# Patient Record
Sex: FEMALE | Race: WHITE | NOT HISPANIC OR LATINO | Employment: OTHER | URBAN - METROPOLITAN AREA
[De-identification: names, ages, dates, MRNs, and addresses within clinical notes are randomized per-mention and may not be internally consistent; named-entity substitution may affect disease eponyms.]

---

## 2024-01-02 ENCOUNTER — TRANSFERRED RECORDS (OUTPATIENT)
Dept: HEALTH INFORMATION MANAGEMENT | Facility: CLINIC | Age: 74
End: 2024-01-02

## 2024-01-09 ENCOUNTER — TRANSFERRED RECORDS (OUTPATIENT)
Dept: HEALTH INFORMATION MANAGEMENT | Facility: CLINIC | Age: 74
End: 2024-01-09

## 2024-01-09 ENCOUNTER — MEDICAL CORRESPONDENCE (OUTPATIENT)
Dept: HEALTH INFORMATION MANAGEMENT | Facility: CLINIC | Age: 74
End: 2024-01-09

## 2024-03-05 ENCOUNTER — TRANSCRIBE ORDERS (OUTPATIENT)
Dept: OTHER | Age: 74
End: 2024-03-05

## 2024-03-05 DIAGNOSIS — R41.3 MEMORY CHANGES: Primary | ICD-10-CM

## 2024-03-10 ENCOUNTER — HEALTH MAINTENANCE LETTER (OUTPATIENT)
Age: 74
End: 2024-03-10

## 2024-05-10 ENCOUNTER — LAB (OUTPATIENT)
Dept: LAB | Facility: HOSPITAL | Age: 74
End: 2024-05-10
Payer: MEDICARE

## 2024-05-10 ENCOUNTER — OFFICE VISIT (OUTPATIENT)
Dept: NEUROLOGY | Facility: CLINIC | Age: 74
End: 2024-05-10
Payer: MEDICARE

## 2024-05-10 VITALS
RESPIRATION RATE: 18 BRPM | WEIGHT: 130 LBS | SYSTOLIC BLOOD PRESSURE: 130 MMHG | DIASTOLIC BLOOD PRESSURE: 80 MMHG | HEART RATE: 82 BPM

## 2024-05-10 DIAGNOSIS — Z87.820 HX OF TRAUMATIC BRAIN INJURY: ICD-10-CM

## 2024-05-10 DIAGNOSIS — R41.9 COGNITIVE COMPLAINTS: ICD-10-CM

## 2024-05-10 DIAGNOSIS — G20.C PRIMARY PARKINSONISM (H): ICD-10-CM

## 2024-05-10 DIAGNOSIS — G20.C PRIMARY PARKINSONISM (H): Primary | ICD-10-CM

## 2024-05-10 DIAGNOSIS — R41.89 SUBJECTIVE MEMORY COMPLAINTS: ICD-10-CM

## 2024-05-10 LAB — TSH SERPL DL<=0.005 MIU/L-ACNC: 1.45 UIU/ML (ref 0.3–4.2)

## 2024-05-10 PROCEDURE — 99205 OFFICE O/P NEW HI 60 MIN: CPT | Performed by: PSYCHIATRY & NEUROLOGY

## 2024-05-10 PROCEDURE — 36415 COLL VENOUS BLD VENIPUNCTURE: CPT

## 2024-05-10 PROCEDURE — G2211 COMPLEX E/M VISIT ADD ON: HCPCS | Performed by: PSYCHIATRY & NEUROLOGY

## 2024-05-10 PROCEDURE — 84443 ASSAY THYROID STIM HORMONE: CPT

## 2024-05-10 PROCEDURE — 82607 VITAMIN B-12: CPT

## 2024-05-10 RX ORDER — ATORVASTATIN CALCIUM 10 MG/1
10 TABLET, FILM COATED ORAL DAILY
COMMUNITY
Start: 2021-02-08 | End: 2024-07-01

## 2024-05-10 RX ORDER — MULTIVIT WITH MINERALS/LUTEIN
1 TABLET ORAL DAILY
COMMUNITY

## 2024-05-10 RX ORDER — LOSARTAN POTASSIUM 25 MG/1
25 TABLET ORAL DAILY
COMMUNITY
End: 2024-07-01

## 2024-05-10 ASSESSMENT — MONTREAL COGNITIVE ASSESSMENT (MOCA)
WHAT LEVEL OF EDUCATION WAS ATTAINED: 0
10. [FLUENCY] NAME WORDS STARTING WITH DESIGNATED LETTER: 0
13. ORIENTATION SUBSCORE: 6
8. SERIAL SUBTRACTION OF 7S: 0
11. FOR EACH PAIR OF WORDS, WHAT CATEGORY DO THEY BELONG TO (OUT OF 2): 2
9. REPEAT EACH SENTENCE: 2
6. READ LIST OF DIGITS [FORWARD/BACKWARD]: 2
12. MEMORY INDEX SCORE: 3
VISUOSPATIAL/EXECUTIVE SUBSCORE: 4
7. [VIGILENCE] TAP WHEN HEARING DESIGNATED LETTER: 1
WHAT IS THE TOTAL SCORE (OUT OF 30): 22
4. NAME EACH OF THE THREE ANIMALS SHOWN: 2

## 2024-05-10 NOTE — NURSING NOTE
Chief Complaint   Patient presents with    Consult     Balance concerns,rule out parkinsons ( handwriting is very difficult), Left sided weakness     Neeta Hanson MA,CMA,7:37 AM
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
## 2024-05-11 LAB — VIT B12 SERPL-MCNC: 498 PG/ML (ref 232–1245)

## 2024-05-22 ENCOUNTER — TELEPHONE (OUTPATIENT)
Dept: NEUROLOGY | Facility: CLINIC | Age: 74
End: 2024-05-22
Payer: MEDICARE

## 2024-05-22 ENCOUNTER — DOCUMENTATION ONLY (OUTPATIENT)
Dept: CARDIOLOGY | Facility: CLINIC | Age: 74
End: 2024-05-22
Payer: MEDICARE

## 2024-05-22 NOTE — TELEPHONE ENCOUNTER
Please abstract the following data from this visit with this patient into the appropriate field in Epic:    Tests that can be patient reported without a hard copy:    Mammogram done on this date: 02/06/2024 (approximately), by this group: [facility] in [place].    Colonoscopy done on this date: 2016 (approximately), by this group: [place], [place].    DEXA scan done on 09/12 at [facility].    Other Tests found in the patient's chart through Chart Review/Care Everywhere:    Note to Abstraction: If this section is blank, no results were found via Chart Review/Care Everywhere.  Hiwot Hoffmann, CMA

## 2024-05-23 ENCOUNTER — DOCUMENTATION ONLY (OUTPATIENT)
Dept: OTHER | Facility: CLINIC | Age: 74
End: 2024-05-23
Payer: MEDICARE

## 2024-06-13 ENCOUNTER — ANCILLARY PROCEDURE (OUTPATIENT)
Dept: NUCLEAR MEDICINE | Facility: CLINIC | Age: 74
End: 2024-06-13
Attending: PSYCHIATRY & NEUROLOGY
Payer: MEDICARE

## 2024-06-13 DIAGNOSIS — G20.C PARKINSONISM (H): Primary | ICD-10-CM

## 2024-06-13 PROCEDURE — 78803 RP LOCLZJ TUM SPECT 1 AREA: CPT | Mod: MG | Performed by: RADIOLOGY

## 2024-06-13 PROCEDURE — A9584 IODINE I-123 IOFLUPANE: HCPCS | Mod: JZ | Performed by: RADIOLOGY

## 2024-06-13 PROCEDURE — G1010 CDSM STANSON: HCPCS | Performed by: RADIOLOGY

## 2024-06-13 RX ORDER — IOFLUPANE I-123 2 MCI/ML
4-6 INJECTION, SOLUTION INTRAVENOUS ONCE
Status: COMPLETED | OUTPATIENT
Start: 2024-06-13 | End: 2024-06-13

## 2024-06-13 RX ADMIN — Medication 130 MG: at 11:04

## 2024-06-13 RX ADMIN — IOFLUPANE I-123 4.6 MILLICURIE: 2 INJECTION, SOLUTION INTRAVENOUS at 11:52

## 2024-07-01 ENCOUNTER — OFFICE VISIT (OUTPATIENT)
Dept: FAMILY MEDICINE | Facility: CLINIC | Age: 74
End: 2024-07-01
Payer: MEDICARE

## 2024-07-01 VITALS
BODY MASS INDEX: 22.14 KG/M2 | HEART RATE: 82 BPM | TEMPERATURE: 97.7 F | DIASTOLIC BLOOD PRESSURE: 78 MMHG | RESPIRATION RATE: 16 BRPM | HEIGHT: 64 IN | SYSTOLIC BLOOD PRESSURE: 122 MMHG | WEIGHT: 129.7 LBS | OXYGEN SATURATION: 98 %

## 2024-07-01 DIAGNOSIS — R26.89 BALANCE PROBLEMS: ICD-10-CM

## 2024-07-01 DIAGNOSIS — Z00.00 ENCOUNTER FOR MEDICARE ANNUAL WELLNESS EXAM: Primary | ICD-10-CM

## 2024-07-01 DIAGNOSIS — I10 BENIGN ESSENTIAL HYPERTENSION: ICD-10-CM

## 2024-07-01 DIAGNOSIS — E78.5 HYPERLIPIDEMIA LDL GOAL <100: ICD-10-CM

## 2024-07-01 DIAGNOSIS — Z78.0 ASYMPTOMATIC MENOPAUSE: ICD-10-CM

## 2024-07-01 DIAGNOSIS — R22.41 MASS OF RIGHT LOWER EXTREMITY: ICD-10-CM

## 2024-07-01 DIAGNOSIS — G20.C PRIMARY PARKINSONISM (H): ICD-10-CM

## 2024-07-01 LAB
ALBUMIN SERPL BCG-MCNC: 4.5 G/DL (ref 3.5–5.2)
ALP SERPL-CCNC: 92 U/L (ref 40–150)
ALT SERPL W P-5'-P-CCNC: 33 U/L (ref 0–50)
ANION GAP SERPL CALCULATED.3IONS-SCNC: 8 MMOL/L (ref 7–15)
AST SERPL W P-5'-P-CCNC: 27 U/L (ref 0–45)
BILIRUB SERPL-MCNC: 0.7 MG/DL
BUN SERPL-MCNC: 21.3 MG/DL (ref 8–23)
CALCIUM SERPL-MCNC: 9.7 MG/DL (ref 8.8–10.2)
CHLORIDE SERPL-SCNC: 102 MMOL/L (ref 98–107)
CHOLEST SERPL-MCNC: 167 MG/DL
CREAT SERPL-MCNC: 0.81 MG/DL (ref 0.51–0.95)
DEPRECATED HCO3 PLAS-SCNC: 29 MMOL/L (ref 22–29)
EGFRCR SERPLBLD CKD-EPI 2021: 76 ML/MIN/1.73M2
FASTING STATUS PATIENT QL REPORTED: ABNORMAL
FASTING STATUS PATIENT QL REPORTED: NORMAL
GLUCOSE SERPL-MCNC: 110 MG/DL (ref 70–99)
HDLC SERPL-MCNC: 74 MG/DL
LDLC SERPL CALC-MCNC: 73 MG/DL
NONHDLC SERPL-MCNC: 93 MG/DL
POTASSIUM SERPL-SCNC: 4.5 MMOL/L (ref 3.4–5.3)
PROT SERPL-MCNC: 7.1 G/DL (ref 6.4–8.3)
SODIUM SERPL-SCNC: 139 MMOL/L (ref 135–145)
TRIGL SERPL-MCNC: 102 MG/DL

## 2024-07-01 PROCEDURE — 80053 COMPREHEN METABOLIC PANEL: CPT

## 2024-07-01 PROCEDURE — 36415 COLL VENOUS BLD VENIPUNCTURE: CPT

## 2024-07-01 PROCEDURE — 80061 LIPID PANEL: CPT

## 2024-07-01 PROCEDURE — 99204 OFFICE O/P NEW MOD 45 MIN: CPT | Mod: 25

## 2024-07-01 PROCEDURE — G0438 PPPS, INITIAL VISIT: HCPCS

## 2024-07-01 RX ORDER — LOSARTAN POTASSIUM 25 MG/1
25 TABLET ORAL DAILY
Qty: 90 TABLET | Refills: 3 | Status: SHIPPED | OUTPATIENT
Start: 2024-07-01

## 2024-07-01 RX ORDER — ATORVASTATIN CALCIUM 10 MG/1
10 TABLET, FILM COATED ORAL DAILY
Qty: 90 TABLET | Refills: 3 | Status: SHIPPED | OUTPATIENT
Start: 2024-07-01

## 2024-07-01 ASSESSMENT — ENCOUNTER SYMPTOMS
PALPITATIONS: 0
WEAKNESS: 0
DYSPHORIC MOOD: 0
SHORTNESS OF BREATH: 0
DIARRHEA: 0
SLEEP DISTURBANCE: 0
COUGH: 0
TREMORS: 0
BLOOD IN STOOL: 0
CONSTIPATION: 0
DIFFICULTY URINATING: 0
ARTHRALGIAS: 0

## 2024-07-01 NOTE — PATIENT INSTRUCTIONS
"Patient Education   Preventive Care Advice   This is general advice we often give to help people stay healthy. Your care team may have specific advice just for you. Please talk to your care team about your own preventive care needs.  Lifestyle  Exercise at least 150 minutes each week (30 minutes a day, 5 days a week).  Do muscle strengthening activities 2 days a week. These help control your weight and prevent disease.  No smoking.  Wear sunscreen to prevent skin cancer.  Have your home tested for radon every 2 to 5 years. Radon is a colorless, odorless gas that can harm your lungs. To learn more, go to www.health.[facility].[place] and search for \"Radon in Homes.\"  Keep guns unloaded and locked up in a safe place like a safe or gun vault, or, use a gun lock and hide the keys. Always lock away bullets separately. To learn more, visit [employer].[place].gov and search for \"safe gun storage.\"  Nutrition  Eat 5 or more servings of fruits and vegetables each day.  Try wheat bread, brown rice and whole grain pasta (instead of white bread, rice, and pasta).  Get enough calcium and vitamin D. Check the label on foods and aim for 100% of the RDA (recommended daily allowance).  Regular exams  Have a dental exam and cleaning every 6 months.  See your health care team every year to talk about:  Any changes in your health.  Any medicines your care team has prescribed.  Preventive care, family planning, and ways to prevent chronic diseases.  Shots (vaccines)   HPV shots (up to age 26), if you've never had them before.  Hepatitis B shots (up to age 59), if you've never had them before.  COVID-19 shot: Get this shot when it's due.  Flu shot: Get a flu shot every year.  Tetanus shot: Get a tetanus shot every 10 years.  Pneumococcal, hepatitis A, and RSV shots: Ask your care team if you need these based on your risk.  Shingles shot (for age 50 and up).  General health tests  Diabetes screening:  Starting at age 35, Get screened for diabetes at least " every 3 years.  If you are younger than age 35, ask your care team if you should be screened for diabetes.  Cholesterol test: At age 39, start having a cholesterol test every 5 years, or more often if advised.  Bone density scan (DEXA): At age 50, ask your care team if you should have this scan for osteoporosis (brittle bones).  Hepatitis C: Get tested at least once in your life.  Abdominal aortic aneurysm screening: Talk to your doctor about having this screening if you:  Have ever smoked; and  Are biologically male; and  Are between the ages of 65 and 75.  STIs (sexually transmitted infections)  Before age 24: Ask your care team if you should be screened for STIs.  After age 24: Get screened for STIs if you're at risk. You are at risk for STIs (including HIV) if:  You are sexually active with more than one person.  You don't use condoms every time.  You or a partner was diagnosed with a sexually transmitted infection.  If you are at risk for HIV, ask about PrEP medicine to prevent HIV.  Get tested for HIV at least once in your life, whether you are at risk for HIV or not.  Cancer screening tests  Cervical cancer screening: If you have a cervix, begin getting regular cervical cancer screening tests at age 21. Most people who have regular screenings with normal results can stop after age 65. Talk about this with your provider.  Breast cancer scan (mammogram): If you've ever had breasts, begin having regular mammograms starting at age 40. This is a scan to check for breast cancer.  Colon cancer screening: It is important to start screening for colon cancer at age 45.  Have a colonoscopy test every 10 years (or more often if you're at risk) Or, ask your provider about stool tests like a FIT test every year or Cologuard test every 3 years.  To learn more about your testing options, visit: www.[employer]/187352.pdf.  For help making a decision, visit: joe/[email or web address].  Prostate cancer screening test: If you have a  prostate and are age 55 to 69, ask your provider if you would benefit from a yearly prostate cancer screening test.  Lung cancer screening: If you are a current or former smoker age 50 to 80, ask your care team if ongoing lung cancer screenings are right for you.  For informational purposes only. Not to replace the advice of your health care provider. Copyright   2023 [employer]. All rights reserved. Clinically reviewed by the [facility]. [employer] [id number] - REV 04/24.  Learning About Activities of Daily Living  What are activities of daily living?     Activities of daily living (ADLs) are the basic self-care tasks you do every day. These include eating, bathing, dressing, and moving around.  As you age, and if you have health problems, you may find that it's harder to do some of these tasks. If so, your doctor can suggest ideas that may help.  To measure what kind of help you may need, your doctor will ask how well you are able to do ADLs. Let your doctor know if there are any tasks that you are having trouble doing. This is an important first step to getting help. And when you have the help you need, you can stay as independent as possible.  How will a doctor assess your ADLs?  Asking about ADLs is part of a routine health checkup your doctor will likely do as you age. Your health check might be done in a doctor's office, in your home, or at a hospital. The goal is to find out if you are having any problems that could make it hard to care for yourself or that make it unsafe for you to be on your own.  To measure your ADLs, your doctor will ask how hard it is for you to do routine tasks. Your doctor may also want to know if you have changed the way you do a task because of a health problem. Your doctor may watch how you:  Walk back and forth.  Keep your balance while you stand or walk.  Move from sitting to standing or from a bed to a chair.  Button or unbutton a shirt or  sweater.  Remove and put on your shoes.  It's common to feel a little worried or anxious if you find you can't do all the things you used to be able to do. Talking with your doctor about ADLs is a way to make sure you're as safe as possible and able to care for yourself as well as you can. You may want to bring a caregiver, friend, or family member to your checkup. They can help you talk to your doctor.  Follow-up care is a key part of your treatment and safety. Be sure to make and go to all appointments, and call your doctor if you are having problems. It's also a good idea to know your test results and keep a list of the medicines you take.  Current as of: October 24, 2023               Content Version: 14.0    1397-3968 "[employer].   Care instructions adapted under license by your healthcare professional. If you have questions about a medical condition or this instruction, always ask your healthcare professional. "[employer] disclaims any warranty or liability for your use of this information.      Preventing Falls: Care Instructions  Injuries and health problems such as trouble walking or poor eyesight can increase your risk of falling. So can some medicines. But there are things you can do to help prevent falls. You can exercise to get stronger. You can also arrange your home to make it safer.    Talk to your doctor about the medicines you take. Ask if any of them increase the risk of falls and whether they can be changed or stopped.   Try to exercise regularly. It can help improve your strength and balance. This can help lower your risk of falling.     Practice fall safety and prevention.    Wear low-heeled shoes that fit well and give your feet good support. Talk to your doctor if you have foot problems that make this hard.  Carry a cellphone or wear a medical alert device that you can use to call for help.  Use stepladders instead of chairs to reach high objects. Don't climb if  "you're at risk for falls. Ask for help, if needed.  Wear the correct eyeglasses, if you need them.    Make your home safer.    Remove rugs, cords, clutter, and furniture from walkways.  Keep your house well lit. Use night-lights in hallways and bathrooms.  Install and use sturdy handrails on stairways.  Wear nonskid footwear, even inside. Don't walk barefoot or in socks without shoes.    Be safe outside.    Use handrails, curb cuts, and ramps whenever possible.  Keep your hands free by using a shoulder bag or backpack.  Try to walk in well-lit areas. Watch out for uneven ground, changes in pavement, and debris.  Be careful in the winter. Walk on the grass or gravel when sidewalks are slippery. Use de-icer on steps and walkways. Add non-slip devices to shoes.    Put grab bars and nonskid mats in your shower or tub and near the toilet. Try to use a shower chair or bath bench when bathing.   Get into a tub or shower by putting in your weaker leg first. Get out with your strong side first. Have a phone or medical alert device in the bathroom with you.   Where can you learn more?  Go to https://www.[employer].net/patiented  Enter G117 in the search box to learn more about \"Preventing Falls: Care Instructions.\"  Current as of: July 17, 2023               Content Version: 14.0    8448-2985 [employer].   Care instructions adapted under license by your healthcare professional. If you have questions about a medical condition or this instruction, always ask your healthcare professional. [employer] disclaims any warranty or liability for your use of this information.      Learning About Sleeping Well  What does sleeping well mean?     Sleeping well means getting enough sleep to feel good and stay healthy. How much sleep is enough varies among people.  The number of hours you sleep and how you feel when you wake up are both important. If you do not feel refreshed, you probably need more sleep. Another " "sign of not getting enough sleep is feeling tired during the day.  Experts recommend that adults get at least 7 or more hours of sleep per day. Children and older adults need more sleep.  Why is getting enough sleep important?  Getting enough quality sleep is a basic part of good health. When your sleep suffers, your physical health, mood, and your thoughts can suffer too. You may find yourself feeling more grumpy or stressed. Not getting enough sleep also can lead to serious problems, including injury, accidents, anxiety, and depression.  What might cause poor sleeping?  Many things can cause sleep problems, including:  Changes to your sleep schedule.  Stress. Stress can be caused by fear about a single event, such as giving a speech. Or you may have ongoing stress, such as worry about work or school.  Depression, anxiety, and other mental or emotional conditions.  Changes in your sleep habits or surroundings. This includes changes that happen where you sleep, such as noise, light, or sleeping in a different bed. It also includes changes in your sleep pattern, such as having jet lag or working a late shift.  Health problems, such as pain, breathing problems, and restless legs syndrome.  Lack of regular exercise.  Using alcohol, nicotine, or caffeine before bed.  How can you help yourself?  Here are some tips that may help you sleep more soundly and wake up feeling more refreshed.  Your sleeping area   Use your bedroom only for sleeping and sex. A bit of light reading may help you fall asleep. But if it doesn't, do your reading elsewhere in the house. Try not to use your TV, computer, smartphone, or tablet while you are in bed.  Be sure your bed is big enough to stretch out comfortably, especially if you have a sleep partner.  Keep your bedroom quiet, dark, and cool. Use curtains, blinds, or a sleep mask to block out light. To block out noise, use earplugs, soothing music, or a \"white noise\" machine.  Your evening " "and bedtime routine   Create a relaxing bedtime routine. You might want to take a warm shower or bath, or listen to soothing music.  Go to bed at the same time every night. And get up at the same time every morning, even if you feel tired.  What to avoid   Limit caffeine (coffee, tea, caffeinated sodas) during the day, and don't have any for at least 6 hours before bedtime.  Avoid drinking alcohol before bedtime. Alcohol can cause you to wake up more often during the night.  Try not to smoke or use tobacco, especially in the evening. Nicotine can keep you awake.  Limit naps during the day, especially close to bedtime.  Avoid lying in bed awake for too long. If you can't fall asleep or if you wake up in the middle of the night and can't get back to sleep within about 20 minutes, get out of bed and go to another room until you feel sleepy.  Avoid taking medicine right before bed that may keep you awake or make you feel hyper or energized. Your doctor can tell you if your medicine may do this and if you can take it earlier in the day.  If you can't sleep   Imagine yourself in a peaceful, pleasant scene. Focus on the details and feelings of being in a place that is relaxing.  Get up and do a quiet or boring activity until you feel sleepy.  Avoid drinking any liquids before going to bed to help prevent waking up often to use the bathroom.  Where can you learn more?  Go to https://www.[employer].net/patiented  Enter J942 in the search box to learn more about \"Learning About Sleeping Well.\"  Current as of: July 10, 2023               Content Version: 14.0    7782-4387 [employer].   Care instructions adapted under license by your healthcare professional. If you have questions about a medical condition or this instruction, always ask your healthcare professional. [employer] disclaims any warranty or liability for your use of this information.         "

## 2024-07-01 NOTE — PROGRESS NOTES
Preventive Care Visit  [facility]  LILIANA Thompson CNP, Family Medicine  Jul 1, 2024      Assessment & Plan     Encounter for Medicare annual wellness exam  On exam, pleasant 74 year old patient. History of   Past Medical History:   Diagnosis Date    Hypertension 2018    Take meds for this    Parkinson disease (H)     No acute or concerning findings on today's physical exam. Vital signs at goal. Mini Cog is without concern. PHQ-2 is 0. No changes to therapies today.   - REVIEW OF HEALTH MAINTENANCE PROTOCOL ORDERS    PHQ-2 Score:         7/1/2024    10:43 AM 6/30/2024     3:42 PM   PHQ-2 ( 1999 Pfizer)   Q1: Little interest or pleasure in doing things 0 0   Q2: Feeling down, depressed or hopeless 0 0   PHQ-2 Score 0 0   Q1: Little interest or pleasure in doing things Not at all    Q2: Feeling down, depressed or hopeless Not at all    PHQ-2 Score 0       Hyperlipidemia LDL goal <100  Continue with current management without changes.  No muscle pains.   Discussed healthy diet and lifestyle.  No aches or pains.   CMP Ordered  Repeat lipid Ordered  - atorvastatin (LIPITOR) 10 MG tablet; Take 1 tablet (10 mg) by mouth daily  - Lipid panel reflex to direct LDL Non-fasting  - Comprehensive metabolic panel (BMP + Alb, Alk Phos, ALT, AST, Total. Bili, TP)    Balance problems  Primary parkinsonism (H)  Established with Dr. Serrano. Managing PD medications which includes Sinemet TID at this time. Patient repots good days and bad days. Balance problems. Declines step therapy/PT at this time, may consider in the future.Has fallen. DEXA ordered as below.     Asymptomatic menopause  Patient due for DEXA scan, doesn't know if she has every had one completed. Ordered today. No known fractures. Does have diagnosis of PF and balance concerns so reducing fracture risk would be beneficial.   - DEXA HIP/PELVIS/SPINE - Future; Future    Benign essential hypertension  Controlled. Continue current  medications. No change in management. No symptoms of chest pain, palpitations, dizziness, headaches, vision changes, SOB, orthopnea or peripheral edema.   - losartan (COZAAR) 25 MG tablet; Take 1 tablet (25 mg) by mouth daily    Leg mass, right  Soft, non-tender mass to the top of the right leg. Anteriorly located. Chronic for over 5 years. Thought to be lipoma from another provider, agree with this assessment. Has not increased in size. No other masses. Does not cause pain or fluctuate. Offered US today, patient declines.     Counseling  Appropriate preventive services were addressed with this patient via screening, questionnaire, or discussion as appropriate for fall prevention, nutrition, physical activity, Tobacco-use cessation, weight loss and cognition.  Checklist reviewing preventive services available has been given to the patient.      Romi Perez is a 74 year old, presenting for the following:  Wellness Visit and Establish Care        7/1/2024    10:41 AM   Additional Questions   Roomed by Nita LAMB MA   Accompanied by          Health Care Directive  Patient has a Health Care Directive on file  Advance care planning document is on file and is current.    History of Present Illness       Reason for visit:  Follow-up    She eats 0-1 servings of fruits and vegetables daily.She consumes 3 sweetened beverage(s) daily.She exercises with enough effort to increase her heart rate 20 to 29 minutes per day.  She exercises with enough effort to increase her heart rate 5 days per week.   She is taking medications regularly.    Saw neurology for Parkinson's concerns. Noticed symptoms with typing. Balanced concerns. No longer biking. Declines PT. Walking with spouse.     Taking Lipitor. Tolerating well. No muscle aches and pains.     Taking Losartan. No CP, SOB, vision changes not corrected by lenses, swelling in ankles. There is no HA.     Mammogram in Feb of 2024. No concerns.     Colonoscopy she thinks  was good for ten years. Does not need another one due to age she reports.     Lump in leg that she would like me to look at. Told Dr. Senior thought it was a lipoma.     DEXA in December in 2012.     COVID 4/1/24 at [facility].         7/1/2024   General Health   How would you rate your overall physical health? Good   Feel stress (tense, anxious, or unable to sleep) To some extent      (!) STRESS CONCERN      7/1/2024   Nutrition   Diet: Regular (no restrictions)          7/1/2024   Social Factors   Worry food won't last until get money to buy more No   Food not last or not have enough money for food? No   Do you have housing? (Housing is defined as stable permanent housing and does not include staying ouside in a car, in a tent, in an abandoned building, in an overnight shelter, or couch-surfing.) Yes   Are you worried about losing your housing? No   Lack of transportation? No   Unable to get utilities (heat,electricity)? No          7/1/2024   Fall Risk   Fallen 2 or more times in the past year? Yes   Trouble with walking or balance? Yes   Gait Speed Test (Document in seconds) 3.94   Gait Speed Test Interpretation Less than or equal to 5.00 seconds - PASS            7/1/2024   Activities of Daily Living- Home Safety   Needs help with the following daily activites Transportation   Safety concerns in the home Throw rugs in the hallway            7/1/2024   Dental   Dentist two times every year? Yes            7/1/2024   Hearing Screening   Hearing concerns? None of the above            7/1/2024   Driving Risk Screening   Patient/family members have concerns about driving (!) DECLINE            7/1/2024   General Alertness/Fatigue Screening   Have you been more tired than usual lately? (!) YES          7/1/2024   Urinary Incontinence Screening   Bothered by leaking urine in past 6 months No          7/1/2024   TB Screening   Were you born outside of the [place]? Decline     Today's PHQ-2 Score:       7/1/2024     10:43 AM   PHQ-2 ( 1999 Pfizer)   Q1: Little interest or pleasure in doing things 0   Q2: Feeling down, depressed or hopeless 0   PHQ-2 Score 0   Q1: Little interest or pleasure in doing things Not at all   Q2: Feeling down, depressed or hopeless Not at all   PHQ-2 Score 0         7/1/2024   Substance Use   Alcohol more than 3/day or more than 7/wk Not Applicable   Do you have a current opioid prescription? No   How severe/bad is pain from 1 to 10? 0/10 (No Pain)   Do you use any other substances recreationally? No      Social History     Tobacco Use    Smoking status: Never     Passive exposure: Never    Smokeless tobacco: Never   Vaping Use    Vaping status: Never Used   Substance Use Topics    Alcohol use: Yes     Comment: rare    Drug use: Never     Mammogram Screening - Mammogram every 1-2 years updated in Health Maintenance based on mutual decision making    ASCVD Risk   The 10-year ASCVD risk score (Catherine YATES, et al., 2019) is: 18.8%    Values used to calculate the score:      Age: 74 years      Sex: Female      Is Non- : No      Diabetic: No      Tobacco smoker: No      Systolic Blood Pressure: 129 mmHg      Is BP treated: Yes      HDL Cholesterol: 74 mg/dL      Total Cholesterol: 167 mg/dL    Reviewed and updated as needed this visit by Provider   Tobacco     Med Hx  Surg Hx   Soc Hx Sexual Activity          Current providers sharing in care for this patient include:  Patient Care Team:  Shanda Bell APRN CNP as PCP - General (Family Medicine)  Kervin Serrano MD as MD (Neurology)  Kervin Serrano MD as Assigned Neuroscience Provider  Vikki Gilliland Psy.D, LP as Psychologist (Neuropsychology)    The following health maintenance items are reviewed in Epic and correct as of today:  Health Maintenance   Topic Date Due    DEXA  Never done    LIPID  Never done    ANNUAL REVIEW OF HM ORDERS  Never done    GLUCOSE  Never done    HEPATITIS C SCREENING  Never done     "MEDICARE ANNUAL WELLNESS VISIT  Never done    COVID-19 Vaccine (2 - 2023-24 season) 02/09/2024    INFLUENZA VACCINE (1) 09/01/2024    FALL RISK ASSESSMENT  07/01/2025    COLORECTAL CANCER SCREENING  01/01/2026    MAMMO SCREENING  02/06/2026    ADVANCE CARE PLANNING  05/23/2029    DTAP/TDAP/TD IMMUNIZATION (2 - Td or Tdap) 08/15/2033    PHQ-2 (once per calendar year)  Completed    Pneumococcal Vaccine: 65+ Years  Completed    ZOSTER IMMUNIZATION  Completed    RSV VACCINE (Pregnancy & 60+)  Completed    IPV IMMUNIZATION  Aged Out    HPV IMMUNIZATION  Aged Out    MENINGITIS IMMUNIZATION  Aged Out    RSV MONOCLONAL ANTIBODY  Aged Out     Review of Systems   Respiratory:  Negative for cough and shortness of breath.    Cardiovascular:  Negative for chest pain, palpitations and leg swelling.   Gastrointestinal:  Negative for blood in stool, constipation and diarrhea.   Genitourinary:  Negative for difficulty urinating and vaginal bleeding.   Musculoskeletal:  Positive for gait problem. Negative for arthralgias.   Neurological:  Negative for tremors and weakness.        Balance concerns   Psychiatric/Behavioral:  Negative for dysphoric mood and sleep disturbance.          Objective    Exam  /78 (BP Location: Right arm, Patient Position: Sitting, Cuff Size: Adult Regular)   Pulse 82   Temp 97.7  F (36.5  C) (Oral)   Resp 16   Ht 5' 4.41\" (1.636 m)   Wt 129 lb 11.2 oz (58.8 kg)   LMP  (LMP Unknown)   SpO2 98%   BMI 21.98 kg/m     Estimated body mass index is 21.98 kg/m  as calculated from the following:    Height as of this encounter: 5' 4.41\" (1.636 m).    Weight as of this encounter: 129 lb 11.2 oz (58.8 kg).    Physical Exam  GENERAL: alert and no distress  EYES: Eyes grossly normal to inspection, PERRL and conjunctivae and sclerae normal  HENT: ear canals and TM's normal, nose and mouth without ulcers or lesions  NECK: no adenopathy, no asymmetry, masses, or scars  RESP: lungs clear to auscultation - no " rales, rhonchi or wheezes  CV: regular rate and rhythm, normal S1 S2, no S3 or S4, no murmur, click or rub, no peripheral edema  ABDOMEN: soft, nontender, no hepatosplenomegaly, no masses and bowel sounds normal  MS: no gross musculoskeletal defects noted, no edema. Soft, palpable mass to the top of the right leg, anteriorly located.   SKIN: no suspicious lesions or rashes  NEURO: Normal strength and tone, mentation intact and speech normal  PSYCH: mentation appears normal, affect normal/bright        7/1/2024   Mini Cog   Clock Draw Score 2 Normal   3 Item Recall 2 objects recalled   Mini Cog Total Score 4        At the end of the visit, I confirmed understanding of what was discussed. Ana and spouse, Víctor,  have no further questions or concerns that were brought up at this time.      Brandy Bell DNP, APRN, FNP-C

## 2024-07-10 ENCOUNTER — OFFICE VISIT (OUTPATIENT)
Dept: NEUROLOGY | Facility: CLINIC | Age: 74
End: 2024-07-10
Payer: MEDICARE

## 2024-07-10 VITALS
SYSTOLIC BLOOD PRESSURE: 129 MMHG | WEIGHT: 130.6 LBS | DIASTOLIC BLOOD PRESSURE: 79 MMHG | HEART RATE: 65 BPM | BODY MASS INDEX: 22.13 KG/M2

## 2024-07-10 DIAGNOSIS — R41.89 SUBJECTIVE MEMORY COMPLAINTS: ICD-10-CM

## 2024-07-10 DIAGNOSIS — R41.9 COGNITIVE COMPLAINTS: ICD-10-CM

## 2024-07-10 DIAGNOSIS — G20.C PRIMARY PARKINSONISM (H): Primary | ICD-10-CM

## 2024-07-10 DIAGNOSIS — Z87.820 HX OF TRAUMATIC BRAIN INJURY: ICD-10-CM

## 2024-07-10 PROCEDURE — G2211 COMPLEX E/M VISIT ADD ON: HCPCS | Performed by: PSYCHIATRY & NEUROLOGY

## 2024-07-10 PROCEDURE — 99215 OFFICE O/P EST HI 40 MIN: CPT | Performed by: PSYCHIATRY & NEUROLOGY

## 2024-07-10 RX ORDER — CARBIDOPA AND LEVODOPA 25; 100 MG/1; MG/1
1 TABLET ORAL 3 TIMES DAILY
Qty: 90 TABLET | Refills: 2 | Status: SHIPPED | OUTPATIENT
Start: 2024-07-10 | End: 2024-08-06
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
## 2024-07-10 NOTE — NURSING NOTE
"Juanita Robbins is a 74 year old female who presents for:  Chief Complaint   Patient presents with    Follow Up     More tiks on the left arm   Datscan review         Initial Vitals:  BP (!) 148/91   Pulse 75   Wt 59.2 kg (130 lb 9.6 oz)   LMP  (LMP Unknown)   BMI 22.13 kg/m   Estimated body mass index is 22.13 kg/m  as calculated from the following:    Height as of 7/1/24: 1.636 m (5' 4.41\").    Weight as of this encounter: 59.2 kg (130 lb 9.6 oz).. Body surface area is 1.64 meters squared. BP completed using cuff size: wrist cuff    Mars Cortez  "

## 2024-07-30 ENCOUNTER — HOSPITAL ENCOUNTER (OUTPATIENT)
Dept: BONE DENSITY | Facility: HOSPITAL | Age: 74
Discharge: HOME | End: 2024-07-30
Payer: MEDICARE

## 2024-07-30 DIAGNOSIS — Z78.0 ASYMPTOMATIC MENOPAUSE: ICD-10-CM

## 2024-07-30 PROCEDURE — 77089 TBS DXA CAL W/I&R FX RISK: CPT

## 2024-07-30 PROCEDURE — 77080 DXA BONE DENSITY AXIAL: CPT

## 2024-08-06 ENCOUNTER — MYC REFILL (OUTPATIENT)
Dept: NEUROLOGY | Facility: CLINIC | Age: 74
End: 2024-08-06
Payer: MEDICARE

## 2024-08-06 DIAGNOSIS — G20.C PRIMARY PARKINSONISM (H): ICD-10-CM

## 2024-08-06 NOTE — TELEPHONE ENCOUNTER
Refill request for: carbidopa levodopa 25-100mg   Directions: Take 1 tablet by mouth 3 times daily Take at least 30 min before meals or 2 hours after meals. Do not mix with food     LOV: 07/10/24  NOV: 01/13/25    Pt requesting 90 day supply instead of 30 days.    90 day supply with 1 refills Medication T'd for review and signature    Fouzia Jones LPN on 8/6/2024 at 3:30 PM

## 2024-08-07 ENCOUNTER — TELEPHONE (OUTPATIENT)
Dept: FAMILY MEDICINE | Facility: CLINIC | Age: 74
End: 2024-08-07
Payer: MEDICARE

## 2024-08-07 RX ORDER — CARBIDOPA AND LEVODOPA 25; 100 MG/1; MG/1
1 TABLET ORAL 3 TIMES DAILY
Qty: 270 TABLET | Refills: 1 | Status: SHIPPED | OUTPATIENT
Start: 2024-08-07

## 2024-08-07 NOTE — TELEPHONE ENCOUNTER
Called and spoke with pt regarding bone density results. Pt has a telephone visit with PCP 8/22 to discuss treatment options.    ALBERTO Mendez.

## 2024-08-08 ENCOUNTER — MYC MEDICAL ADVICE (OUTPATIENT)
Dept: FAMILY MEDICINE | Facility: CLINIC | Age: 74
End: 2024-08-08
Payer: MEDICARE

## 2024-08-09 ENCOUNTER — MYC MEDICAL ADVICE (OUTPATIENT)
Dept: FAMILY MEDICINE | Facility: CLINIC | Age: 74
End: 2024-08-09
Payer: MEDICARE

## 2024-08-22 ENCOUNTER — VIRTUAL VISIT (OUTPATIENT)
Dept: FAMILY MEDICINE | Facility: CLINIC | Age: 74
End: 2024-08-22
Payer: MEDICARE

## 2024-08-22 ENCOUNTER — MYC MEDICAL ADVICE (OUTPATIENT)
Dept: FAMILY MEDICINE | Facility: CLINIC | Age: 74
End: 2024-08-22

## 2024-08-22 DIAGNOSIS — M81.0 AGE-RELATED OSTEOPOROSIS WITHOUT CURRENT PATHOLOGICAL FRACTURE: Primary | ICD-10-CM

## 2024-08-22 PROCEDURE — 99442 PR PHYSICIAN TELEPHONE EVALUATION 11-20 MIN: CPT | Mod: 93

## 2024-08-22 NOTE — PROGRESS NOTES
Ana is a 74 year old who is being evaluated via a billable telephone visit.    What phone number would you like to be contacted at? [phone]  How would you like to obtain your AVS? Corey  Originating Location (pt. Location): Home    Distant Location (provider location):  On-site    Assessment & Plan     Age-related osteoporosis without current pathological fracture  DEXA scan shows osteoporosis in several locations.  Discussed with patient today.  Patient does have a history of Parkinson's disease with somewhat unsteady gait, but has been managing well.  He is changing lifestyle habits and exercise to make it safer to prevent falls.  I do think it be beneficial for patient to be on a biphosphonate, does not have any esophageal disorders or trouble swallowing.  We will check a vitamin D, calcium, phosphorus, and magnesium today.  If normal start on biphosphonate.  We will do Risedronate monthly.  Discussed appropriate use and how to take this.  Patient has no questions or concerns.  Will follow-up once lab results are in.  - Vitamin D Deficiency; Future  - Calcium; Future  - Phosphorus; Future  - Magnesium; Future    Subjective   Ana is a 74 year old, presenting for the following health issues:  Review Results  (Dexa scan )      8/22/2024    11:10 AM   Additional Questions   Roomed by JACE Aleman     History of Present Illness       Reason for visit:  Osteoporosis dixacan  Symptoms include:  None but Dexacan reported I was at risk  Had these symptoms before:  No    She eats 2-3 servings of fruits and vegetables daily.She consumes 0 sweetened beverage(s) daily.She exercises with enough effort to increase her heart rate 30 to 60 minutes per day.  She exercises with enough effort to increase her heart rate 5 days per week.   She is taking medications regularly.     Review DEXA results.  Patient is taking a multivitamin, but no specific vitamin D or calcium.  Has not had a broken bone.  Some difficulty  "with certain activities, but has not fallen.  Is no longer biking to decrease the risk of a fall.    ROS: Negative for acute concerns today.       Objective    Vitals - Patient Reported  Weight (Patient Reported): 59.1 kg (130 lb 6.4 oz)  Height (Patient Reported): 164.5 cm (5' 4.75\")  BMI (Based on Pt Reported Ht/Wt): 21.87    Physical Exam   General: Alert and no distress //Respiratory: No audible wheeze, cough, or shortness of breath // Psychiatric:  Appropriate affect, tone, and pace of words      Phone call duration: 13 minutes    At the end of the visit, I confirmed understanding of what was discussed. Juanita has no further questions or concerns that were brought up at this time.      Brandy Bell DNP, APRN, FNP-C    "

## 2024-08-24 ENCOUNTER — LAB (OUTPATIENT)
Dept: LAB | Facility: CLINIC | Age: 74
End: 2024-08-24
Payer: MEDICARE

## 2024-08-24 DIAGNOSIS — Z11.59 NEED FOR HEPATITIS C SCREENING TEST: Primary | ICD-10-CM

## 2024-08-24 DIAGNOSIS — M81.0 AGE-RELATED OSTEOPOROSIS WITHOUT CURRENT PATHOLOGICAL FRACTURE: ICD-10-CM

## 2024-08-24 LAB
CALCIUM SERPL-MCNC: 10.2 MG/DL (ref 8.8–10.4)
HCV AB SERPL QL IA: NONREACTIVE
MAGNESIUM SERPL-MCNC: 2.3 MG/DL (ref 1.7–2.3)
PHOSPHATE SERPL-MCNC: 3.4 MG/DL (ref 2.5–4.5)
VIT D+METAB SERPL-MCNC: 42 NG/ML (ref 20–50)

## 2024-08-24 PROCEDURE — 82306 VITAMIN D 25 HYDROXY: CPT

## 2024-08-24 PROCEDURE — 86803 HEPATITIS C AB TEST: CPT

## 2024-08-24 PROCEDURE — 36415 COLL VENOUS BLD VENIPUNCTURE: CPT

## 2024-08-24 PROCEDURE — 83735 ASSAY OF MAGNESIUM: CPT

## 2024-08-24 PROCEDURE — 82310 ASSAY OF CALCIUM: CPT

## 2024-08-24 PROCEDURE — 84100 ASSAY OF PHOSPHORUS: CPT

## 2024-08-26 RX ORDER — RISEDRONATE SODIUM 150 MG/1
150 TABLET, FILM COATED ORAL
Qty: 3 TABLET | Refills: 3 | Status: SHIPPED | OUTPATIENT
Start: 2024-08-26

## 2024-09-16 ENCOUNTER — MYC MEDICAL ADVICE (OUTPATIENT)
Dept: FAMILY MEDICINE | Facility: CLINIC | Age: 74
End: 2024-09-16
Payer: MEDICARE

## 2024-10-18 ENCOUNTER — ALLIED HEALTH/NURSE VISIT (OUTPATIENT)
Dept: FAMILY MEDICINE | Facility: CLINIC | Age: 74
End: 2024-10-18
Payer: MEDICARE

## 2024-10-18 DIAGNOSIS — Z23 ENCOUNTER FOR IMMUNIZATION: Primary | ICD-10-CM

## 2024-10-18 PROCEDURE — 90480 ADMN SARSCOV2 VAC 1/ONLY CMP: CPT

## 2024-10-18 PROCEDURE — 91320 SARSCV2 VAC 30MCG TRS-SUC IM: CPT

## 2024-10-28 ENCOUNTER — MYC REFILL (OUTPATIENT)
Dept: LAB | Facility: CLINIC | Age: 74
End: 2024-10-28

## 2024-10-28 DIAGNOSIS — M81.0 AGE-RELATED OSTEOPOROSIS WITHOUT CURRENT PATHOLOGICAL FRACTURE: ICD-10-CM

## 2024-11-15 ENCOUNTER — MYC MEDICAL ADVICE (OUTPATIENT)
Dept: NEUROLOGY | Facility: CLINIC | Age: 74
End: 2024-11-15
Payer: MEDICARE

## 2024-11-15 NOTE — TELEPHONE ENCOUNTER
Yes we would need to meet sooner.  Most likely it is not related to the Parkinson's.  There are other neurological conditions that cause this.

## 2024-11-20 ENCOUNTER — OFFICE VISIT (OUTPATIENT)
Dept: NEUROLOGY | Facility: CLINIC | Age: 74
End: 2024-11-20
Payer: MEDICARE

## 2024-11-20 ENCOUNTER — LAB (OUTPATIENT)
Dept: LAB | Facility: HOSPITAL | Age: 74
End: 2024-11-20
Payer: MEDICARE

## 2024-11-20 VITALS
HEART RATE: 82 BPM | WEIGHT: 132 LBS | DIASTOLIC BLOOD PRESSURE: 90 MMHG | SYSTOLIC BLOOD PRESSURE: 149 MMHG | BODY MASS INDEX: 22.53 KG/M2 | HEIGHT: 64 IN

## 2024-11-20 DIAGNOSIS — G20.C PRIMARY PARKINSONISM (H): Primary | ICD-10-CM

## 2024-11-20 DIAGNOSIS — Z87.820 HX OF TRAUMATIC BRAIN INJURY: ICD-10-CM

## 2024-11-20 DIAGNOSIS — R41.89 SUBJECTIVE MEMORY COMPLAINTS: ICD-10-CM

## 2024-11-20 DIAGNOSIS — R41.9 COGNITIVE COMPLAINTS: ICD-10-CM

## 2024-11-20 DIAGNOSIS — G70.00 MYASTHENIA GRAVIS WITHOUT EXACERBATION (H): ICD-10-CM

## 2024-11-20 PROCEDURE — 86041 ACETYLCHOLN RCPTR BNDNG ANTB: CPT

## 2024-11-20 PROCEDURE — 86043 ACETYLCHOLN RCPTR MODLG ANTB: CPT

## 2024-11-20 PROCEDURE — 86255 FLUORESCENT ANTIBODY SCREEN: CPT

## 2024-11-20 PROCEDURE — 36415 COLL VENOUS BLD VENIPUNCTURE: CPT

## 2024-11-20 PROCEDURE — 86042 ACETYLCHOLN RCPTR BLCKG ANTB: CPT

## 2024-11-20 RX ORDER — PYRIDOSTIGMINE BROMIDE 60 MG/1
60 TABLET ORAL 3 TIMES DAILY
Qty: 270 TABLET | Refills: 1 | Status: SHIPPED | OUTPATIENT
Start: 2024-11-20

## 2024-11-20 NOTE — NURSING NOTE
Chief Complaint   Patient presents with    Parkinson     Patient states she is getting double vision when fatigued. She had her eyes checked in January. Symptoms began within the last month. Her voice gets quieter when she is fatigued also.      Eileen Nunn MA

## 2024-11-20 NOTE — PATIENT INSTRUCTIONS
Instructions for Blood Draw    Hours:   [facility]: M-F 7am-5pm, Saturday- 9am-1pm No appointment is needed.  [facility]: M-F 7am-5pm, Saturday & - 9am-12pm No appointment is needed.  Schedule Lab Appointments through [employer] or by [phone] ([phone])    [facility] (2nd floor) or [facility] ([facility])     For Vitamin Blood Draws:  - Nothing to eat or drink within 12 hours of having your blood drawn (fasting).    - The morning of your blood draw, you can drink water if you take morning medications.
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
## 2024-11-22 LAB
ACHR BIND AB SER-SCNC: 0 NMOL/L
ACHR BLOCK AB/ACHR TOTAL SFR SER: 14 %
STRIA MUS IGG SER QL IF: NORMAL

## 2024-11-23 LAB — ACHR MOD AB/ACHR TOTAL SFR SER: 0 %

## 2024-12-17 ENCOUNTER — MYC MEDICAL ADVICE (OUTPATIENT)
Dept: FAMILY MEDICINE | Facility: CLINIC | Age: 74
End: 2024-12-17
Payer: MEDICARE

## 2025-01-13 ENCOUNTER — OFFICE VISIT (OUTPATIENT)
Dept: NEUROLOGY | Facility: CLINIC | Age: 75
End: 2025-01-13
Payer: MEDICARE

## 2025-01-13 ENCOUNTER — MYC MEDICAL ADVICE (OUTPATIENT)
Dept: NEUROLOGY | Facility: CLINIC | Age: 75
End: 2025-01-13

## 2025-01-13 ENCOUNTER — TELEPHONE (OUTPATIENT)
Dept: NEUROLOGY | Facility: CLINIC | Age: 75
End: 2025-01-13

## 2025-01-13 VITALS
SYSTOLIC BLOOD PRESSURE: 134 MMHG | DIASTOLIC BLOOD PRESSURE: 82 MMHG | WEIGHT: 130 LBS | HEIGHT: 64 IN | HEART RATE: 75 BPM | BODY MASS INDEX: 22.2 KG/M2

## 2025-01-13 DIAGNOSIS — G20.C PRIMARY PARKINSONISM (H): Primary | ICD-10-CM

## 2025-01-13 DIAGNOSIS — G70.00 MYASTHENIA GRAVIS WITHOUT EXACERBATION (H): ICD-10-CM

## 2025-01-13 PROCEDURE — G2211 COMPLEX E/M VISIT ADD ON: HCPCS | Performed by: PSYCHIATRY & NEUROLOGY

## 2025-01-13 PROCEDURE — 99214 OFFICE O/P EST MOD 30 MIN: CPT | Performed by: PSYCHIATRY & NEUROLOGY

## 2025-01-13 RX ORDER — PYRIDOSTIGMINE BROMIDE 60 MG/1
TABLET ORAL
Qty: 180 TABLET | Refills: 1 | Status: SHIPPED | OUTPATIENT
Start: 2025-01-13

## 2025-01-13 NOTE — TELEPHONE ENCOUNTER
[facility] Call Center    Phone Message    May a detailed message be left on voicemail: yes     Reason for Call: Other: Pt is requesting a call back regarding dosing instructions on her Medication for Mestinon and Lipitor.  Please contact Pt and advise.  Pt stated she was not able to find this information on her after visit summary.  Pt can be reached at: [phone].    Action Taken: Other: Neurology     Travel Screening: Not Applicable     Date of Service:
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
## 2025-01-13 NOTE — TELEPHONE ENCOUNTER
"Spoke with Ana and confirmed dosage instructions for Mestinon as described below. Also confirmed to trial off Lipitor for 1-2 weeks. Patient verbalized understanding.     Ruba JORGENSEN RN, BSN  [facility]    \"Stopped 10 mg Lipitor  -     pyRIDostigmine (MESTINON) 60 MG tablet; Take 1.5 -2 pills at 4-5 PM as needed and tolerated.\"   "

## 2025-01-13 NOTE — NURSING NOTE
Chief Complaint   Patient presents with    Follow Up     Patient states she has been doing well. She has been having issues with her bowels and bladder. Her eyes have been doing about the same since her last visit. She is still seeing double vision when she is tired.      Eileen Nunn MA

## 2025-01-13 NOTE — TELEPHONE ENCOUNTER
Addressed in TE 1/13, no further action needed. Closing encounter.     Ruba JORGENSEN RN, BSN  [facility]

## 2025-03-04 DIAGNOSIS — E78.5 HYPERLIPIDEMIA LDL GOAL <100: ICD-10-CM

## 2025-03-04 NOTE — TELEPHONE ENCOUNTER
Reason for Call:  Medication or medication refill:    Do you use a [facility]?  Name of the pharmacy and phone number for the current request:    [facility] - [place], [place] - [address] [place] [address] E       Name of the medication requested:     Disp Refills Start End FRANDY    atorvastatin (LIPITOR) 10 MG tablet (Discontinued) 90 tablet 3 7/1/2024 1/13/2025 No   Sig - Route: Take 1 tablet (10 mg) by mouth daily - Oral       Other request: Per patient medication was discontinued in error but Neurology provider Dr. Serrano said she can take medication.(See 1/27/25 Encounter)  Needs New RX sent to pharmacy.       Call taken on 3/4/2025 at 4:44 PM by Monika Cabrera

## 2025-03-05 NOTE — TELEPHONE ENCOUNTER
01/13/25 OV:   She is on Lipitor for hyperlipidemia which can sometimes exacerbate myasthenia gravis. Will try stopping Lipitor to see if it helps. She can go back on the Lipitor in 1 to 2 weeks if it is not helping.     Ned Celis Jr., CMA on 3/5/2025 at 2:05 PM

## 2025-03-05 NOTE — TELEPHONE ENCOUNTER
Please call patient for a status update on this medication , prescription was discontinued on 1/13/2025 by Kervin Serrano MD.

## 2025-03-06 RX ORDER — ATORVASTATIN CALCIUM 10 MG/1
10 TABLET, FILM COATED ORAL DAILY
Qty: 90 TABLET | Refills: 3 | OUTPATIENT
Start: 2025-03-06

## 2025-03-24 ENCOUNTER — MYC MEDICAL ADVICE (OUTPATIENT)
Dept: FAMILY MEDICINE | Facility: CLINIC | Age: 75
End: 2025-03-24
Payer: MEDICARE

## 2025-04-23 ENCOUNTER — MYC MEDICAL ADVICE (OUTPATIENT)
Dept: FAMILY MEDICINE | Facility: CLINIC | Age: 75
End: 2025-04-23
Payer: MEDICARE

## 2025-04-23 ENCOUNTER — TELEPHONE (OUTPATIENT)
Dept: NEUROLOGY | Facility: CLINIC | Age: 75
End: 2025-04-23
Payer: MEDICARE

## 2025-04-23 DIAGNOSIS — H53.2 DIPLOPIA: Primary | ICD-10-CM

## 2025-04-23 NOTE — TELEPHONE ENCOUNTER
Can set up with neuro-ophthalmology if the MRI comes back negative.  Keep follow-up in August to further evaluate Parkinson's.

## 2025-04-23 NOTE — TELEPHONE ENCOUNTER
If your symptoms or not getting better we need to do an MRI of the brain.  I would also like her to see a neuro-ophthalmologist at the [facility] or at [facility] (assuming MRI brain is negative).  I will order the MRI for her.

## 2025-04-23 NOTE — TELEPHONE ENCOUNTER
"Received message from clinic staff that patient's vision isn't getting better (having double vision) and she can only see out of one eye at night.    RN returned call to Ana, who endorses the double vision but denies only being able to see out of one eye at night - \"I can see out of both eyes, but I still have the double vision.\" (Patient seemed confused a few times during phone call over why RN was calling her) Ana notes that the increased dose of Mestinon didn't help with her symptoms, and she stopped taking the CD/LD once the Mestinon was increased. Ana is requesting a referral to [facility] to further investigate her symptoms.    Routing to Dr. Serrano for review.    Winsome Cid RN, BSN  [facility]         "

## 2025-04-23 NOTE — TELEPHONE ENCOUNTER
Returned call to Ana and her - relayed plan below. They are agreeable to obtain an MRI and will get this scheduled.     Ruba JORGENSEN RN, BSN  [facility]rology

## 2025-04-23 NOTE — TELEPHONE ENCOUNTER
[facility] Call Center    Phone Message    May a detailed message be left on voicemail: yes     Reason for Call: Other: Ana calling to request a call back from Winsome to discuss her referral to [facility]. Ana stated that [facility] doesn't require a referral, however her insurance does. She also would like her MRI to be sent to [facility] as well. Please call Ana to discuss at your earliest convenience.     Action Taken: Message routed to:  Other: [facility]    Travel Screening: Not Applicable     Date of Service:

## 2025-04-23 NOTE — TELEPHONE ENCOUNTER
Per pt, not getting better. Can only see in one eye at night and have double vision. Wants a referral to [facility].    Please advise and call pt @ [phone]

## 2025-04-29 ENCOUNTER — TELEPHONE (OUTPATIENT)
Dept: FAMILY MEDICINE | Facility: CLINIC | Age: 75
End: 2025-04-29
Payer: MEDICARE

## 2025-04-29 NOTE — TELEPHONE ENCOUNTER
FYI - Status Update    Who is Calling: patient    Update: Pt would like to update pcp that she received her pfizer vaccine today at the [facility] 4/29.     Does caller want a call/response back: No

## 2025-05-02 ENCOUNTER — HOSPITAL ENCOUNTER (OUTPATIENT)
Dept: MRI IMAGING | Facility: HOSPITAL | Age: 75
Discharge: HOME | End: 2025-05-02
Attending: PSYCHIATRY & NEUROLOGY | Admitting: PSYCHIATRY & NEUROLOGY
Payer: MEDICARE

## 2025-05-02 DIAGNOSIS — H53.2 DIPLOPIA: ICD-10-CM

## 2025-05-02 PROCEDURE — 70551 MRI BRAIN STEM W/O DYE: CPT

## 2025-05-07 ENCOUNTER — MYC MEDICAL ADVICE (OUTPATIENT)
Dept: NEUROLOGY | Facility: CLINIC | Age: 75
End: 2025-05-07
Payer: MEDICARE

## 2025-05-07 ENCOUNTER — PATIENT OUTREACH (OUTPATIENT)
Dept: CARE COORDINATION | Facility: CLINIC | Age: 75
End: 2025-05-07
Payer: MEDICARE

## 2025-05-30 ENCOUNTER — MYC MEDICAL ADVICE (OUTPATIENT)
Dept: FAMILY MEDICINE | Facility: CLINIC | Age: 75
End: 2025-05-30
Payer: MEDICARE

## 2025-05-30 NOTE — TELEPHONE ENCOUNTER
Patient is wanting to see PCP, wondering if she should make an appointment for x-ray or MRI now or will that be discussed at the appointment?

## 2025-05-30 NOTE — TELEPHONE ENCOUNTER
Appointment with me or orthopaedics (jm kenney, [facility] ortho). Okay to override if she wants to see me.

## 2025-05-30 NOTE — TELEPHONE ENCOUNTER
I will need to assess her first to determine which image is needed. Okay to override to get her in.

## 2025-06-03 ENCOUNTER — MYC REFILL (OUTPATIENT)
Dept: FAMILY MEDICINE | Facility: CLINIC | Age: 75
End: 2025-06-03
Payer: MEDICARE

## 2025-06-03 DIAGNOSIS — E78.5 HYPERLIPIDEMIA LDL GOAL <100: ICD-10-CM

## 2025-06-03 RX ORDER — ATORVASTATIN CALCIUM 10 MG/1
10 TABLET, FILM COATED ORAL DAILY
Qty: 90 TABLET | Refills: 0 | Status: SHIPPED | OUTPATIENT
Start: 2025-06-03

## 2025-06-05 RX ORDER — RISEDRONATE SODIUM 150 MG/1
150 TABLET, FILM COATED ORAL
Qty: 3 TABLET | Refills: 3 | OUTPATIENT
Start: 2025-06-05

## 2025-06-18 ENCOUNTER — OFFICE VISIT (OUTPATIENT)
Dept: FAMILY MEDICINE | Facility: CLINIC | Age: 75
End: 2025-06-18
Payer: MEDICARE

## 2025-06-18 VITALS
DIASTOLIC BLOOD PRESSURE: 82 MMHG | TEMPERATURE: 98.2 F | HEART RATE: 78 BPM | WEIGHT: 133.8 LBS | BODY MASS INDEX: 22.84 KG/M2 | OXYGEN SATURATION: 100 % | SYSTOLIC BLOOD PRESSURE: 132 MMHG | HEIGHT: 64 IN | RESPIRATION RATE: 20 BRPM

## 2025-06-18 DIAGNOSIS — M25.512 CHRONIC LEFT SHOULDER PAIN: Primary | ICD-10-CM

## 2025-06-18 DIAGNOSIS — G89.29 CHRONIC LEFT SHOULDER PAIN: Primary | ICD-10-CM

## 2025-06-18 PROCEDURE — 99213 OFFICE O/P EST LOW 20 MIN: CPT

## 2025-06-18 PROCEDURE — 3075F SYST BP GE 130 - 139MM HG: CPT

## 2025-06-18 PROCEDURE — 3079F DIAST BP 80-89 MM HG: CPT

## 2025-06-18 NOTE — PROGRESS NOTES
"  Assessment & Plan     Chronic left shoulder pain  Ongoing left shoulder pain since fall on ice in April. No pain reproduced today. Full ROM with testing. CMS intact. I do think a visit with orthopedics is warranted as she is having pain with some ROM. Suspect rotator cuff pathology as she is having pain with certain positions and overhead. No x-ray today as there is no acute injury, pain with bony palpation, or other atypical findings reported today. She is agreeable to plan.   - Orthopedic  Referral; Future      Subjective   Ana is a 75 year old, presenting for the following health issues:  Shoulder Injury (Left side, fell on ice in April)        6/18/2025     2:50 PM   Additional Questions   Roomed by WILL King   Accompanied by      History of Present Illness       Reason for visit:  Left shoulder pain following a fall  Symptom intensity:  Moderate  Symptom progression:  Staying the same  Had these symptoms before:  No  What makes it worse:  I cannot reach behind myself & reaching over my head is difficult  What makes it better:  Pain isn't severe.  Time helps. She is missing 1 dose(s) of medications per week.  She is not taking prescribed medications regularly due to remembering to take.        Feel on the ice in April. Left shoulder pain. Hurting with movement when moving in certain positions. Pain with putting t-shirt and bra on. Only with certain movements. Not all the time. No pain when sitting. No numbness or weakness. Has been feeling well otherwise.         Objective    BP (!) 147/85   Pulse 78   Temp 98.2  F (36.8  C) (Oral)   Resp 20   Ht 1.636 m (5' 4.4\")   Wt 60.7 kg (133 lb 12.8 oz)   LMP  (LMP Unknown)   SpO2 100%   BMI 22.68 kg/m    Body mass index is 22.68 kg/m .  Physical Exam  Vitals reviewed.   Constitutional:       General: She is not in acute distress.  Pulmonary:      Effort: No respiratory distress.   Musculoskeletal:      Left shoulder: No swelling, " tenderness, bony tenderness or crepitus. Normal range of motion. Normal strength. Normal pulse.      Comments: No pain with palpation of the clavicle or scapula. No pain with palpation of the humeral head. No crepitus with rotation of the humeral head. Negative empty can. Equal internal rotation compared to the left. 2+ radial pulse. Warm skin, appropriate color.    Neurological:      General: No focal deficit present.      Mental Status: She is alert. Mental status is at baseline.      Motor: No weakness.   Psychiatric:         Mood and Affect: Mood normal.         Thought Content: Thought content normal.        At the end of the visit, I confirmed understanding of what was discussed. Juanita has no further questions or concerns that were brought up at this time.      Brandy Bell DNP, APRN, FNP-C

## 2025-06-19 ENCOUNTER — PATIENT OUTREACH (OUTPATIENT)
Dept: CARE COORDINATION | Facility: CLINIC | Age: 75
End: 2025-06-19
Payer: MEDICARE

## 2025-06-19 SDOH — HEALTH STABILITY: PHYSICAL HEALTH: ON AVERAGE, HOW MANY DAYS PER WEEK DO YOU ENGAGE IN MODERATE TO STRENUOUS EXERCISE (LIKE A BRISK WALK)?: 2 DAYS

## 2025-06-19 SDOH — HEALTH STABILITY: PHYSICAL HEALTH: ON AVERAGE, HOW MANY MINUTES DO YOU ENGAGE IN EXERCISE AT THIS LEVEL?: 10 MIN

## 2025-06-19 NOTE — PROGRESS NOTES
ASSESSMENT & PLAN    Discussed diagnosis and treatment options with the patient today. A shared decision making model was used. The patient's values and choices were respected. The following represents what was discussed and decided upon by the provider and the patient.     Ana was seen today for pain.    Diagnoses and all orders for this visit:    Chronic left shoulder pain  -     Orthopedic  Referral  -     XR Shoulder Left G/E 3 Views; Future  -     Physical Therapy  Referral; Future        Dorsal superior scapular pain radiating down deltoid s/p fall with limited range of motion and pain radiating into RCT. Almost full range of motion but painful arc and + empty can for weakness radiating into supraspinatus muscle indicative of partial rotator cuff injury. Discussed nonoperative options such as CSI, oral medications, topical medications, rest, lifting restrictions or further imaging. Also discussed surgical option. At this time surgery would not be an option so held off on further imaging and surgical referral. Prefers not to take more medications and not interested in cortisone or trigger point injections for pain control. She has home exercises that are helping with range of motion. She does have barriers to transportation with parkinsons so unable to go regularly to PT. if she is not improving she will seek out PT in her area. Printed out referral for patient to take to local facility.   Can follow-up if no improvement as needed  Plan as below:   -Light range of motion and limited lifting for the next two weeks then can lift as tolerated by pain  -Can use 500mg aleve twice a day as needed for pain relief if needed. -Tylenol 500-1000mg (up to three times per day) as needed for pain relief.   -Physical therapy ordered for strengthening. Please schedule appointment if not improving with home exercises. This can be done at a [facility]. They should call to schedule in 1-2 days. If  wanting to go to closer outside facility. I also printed out order that can be taken outside of [facility] system.     Michelle House DO  [facility]    -----  Chief Complaint   Patient presents with    Left Shoulder - Pain       SUBJECTIVE  Juanita Robbins is a/an 75 year old female who is seen in consultation at the request of  Shanda Bell C.N.P. for evaluation of left shoulder pain.     The patient is seen with their .  The patient is Left handed    Onset: 2-3 month(s) ago. Patient describes injury as fell on shoulder on ice.   Location of Pain: left posterior shoulder  Worsened by: reaching behind, overhead, abduction   Better with: home exercises  Treatments tried: home exercises  Associated symptoms: aching, sharp with movements.   Denies numbness, tingling, locking  Orthopedic/Surgical history: NO  Social History/Occupation: retired,     Patient Active Problem List   Diagnosis    Primary parkinsonism (H)       Current Outpatient Medications   Medication Sig Dispense Refill    atorvastatin (LIPITOR) 10 MG tablet Take 1 tablet (10 mg) by mouth daily. 90 tablet 0    losartan (COZAAR) 25 MG tablet Take 1 tablet (25 mg) by mouth daily 90 tablet 3    multivitamin (CENTRUM SILVER) tablet Take 1 tablet by mouth daily      pyRIDostigmine (MESTINON) 60 MG tablet Take 1.5 -2 pills at 4-5 PM as needed and tolerated. 180 tablet 1    RISEdronate (ACTONEL) 150 MG tablet Take 1 tablet (150 mg) by mouth every 30 days. 3 tablet 3       PMH, Medications and Allergies were reviewed and updated as needed.    REVIEW OF SYSTEMS:  10 point ROS is negative other than symptoms noted above in HPI        OBJECTIVE:  LMP  (LMP Unknown)        General: healthy, alert and in no distress  Skin: no suspicious lesions or rash.  CV: distal perfusion intact  Resp: normal respiratory effort without conversational dyspnea   Psych: normal mood and affect  Gait: NORMAL  Neuro: Normal light  sensory exam of UE bilaterally     LEFT SHOULDER  Inspection:    no swelling, bruising, discoloration, or obvious deformity or asymmetry  Palpation:    Tender about the anterior capsule and supraspinatus insertion and superior scapular border. No aC tenderness. Remainder of bony and tendinous landmarks are nontender.    Crepitus is Absent  Active Range of Motion:     Abduction 1650 / FF 1350 /  / IR hip pocket restricted and painful.   Strength:    Scapular plane abduction 4+/5 / ER 5-/5 / IR 4+/5 / biceps 5-/5  Special Tests:    Positive: supraspinatus (empty can)    Negative: lift-off, and Speed's        RADIOLOGY:  Final results and radiologist's interpretation, available in the Psychiatric health record.  Images were reviewed with the patient in the office today.    My personal interpretation of the performed imaging:     X-ray shoulder left3 views: 6/23/2025  left glenohumeral and AC joints are negative for acute fracture or dislocation.  Normal alignment. open joint space.Mild sclerosis of joint with inferior glenoid well corticated bony ossicle.                  Disclaimer: This note consists of symbols derived from keyboarding, dictation and/or voice recognition software. As a result, there may be errors in the script that have gone undetected. Please consider this when interpreting information found in this chart.

## 2025-06-23 ENCOUNTER — OFFICE VISIT (OUTPATIENT)
Dept: ORTHOPEDICS | Facility: CLINIC | Age: 75
End: 2025-06-23
Payer: MEDICARE

## 2025-06-23 ENCOUNTER — ANCILLARY PROCEDURE (OUTPATIENT)
Dept: GENERAL RADIOLOGY | Facility: CLINIC | Age: 75
End: 2025-06-23
Attending: STUDENT IN AN ORGANIZED HEALTH CARE EDUCATION/TRAINING PROGRAM
Payer: MEDICARE

## 2025-06-23 DIAGNOSIS — M25.512 CHRONIC LEFT SHOULDER PAIN: ICD-10-CM

## 2025-06-23 DIAGNOSIS — G89.29 CHRONIC LEFT SHOULDER PAIN: ICD-10-CM

## 2025-06-23 PROCEDURE — 99204 OFFICE O/P NEW MOD 45 MIN: CPT | Performed by: STUDENT IN AN ORGANIZED HEALTH CARE EDUCATION/TRAINING PROGRAM

## 2025-06-23 PROCEDURE — 73030 X-RAY EXAM OF SHOULDER: CPT | Mod: TC | Performed by: RADIOLOGY

## 2025-06-23 NOTE — LETTER
6/23/2025      Juanita Robbins  [address] [facility] [address]      Dear Colleague,    Thank you for referring your patient, Juanita Robbins, to the [facility]. Please see a copy of my visit note below.    ASSESSMENT & PLAN    Discussed diagnosis and treatment options with the patient today. A shared decision making model was used. The patient's values and choices were respected. The following represents what was discussed and decided upon by the provider and the patient.     Ana was seen today for pain.    Diagnoses and all orders for this visit:    Chronic left shoulder pain  -     Orthopedic  Referral  -     XR Shoulder Left G/E 3 Views; Future  -     Physical Therapy  Referral; Future        Dorsal superior scapular pain radiating down deltoid s/p fall with limited range of motion and pain radiating into RCT. Almost full range of motion but painful arc and + empty can for weakness radiating into supraspinatus muscle indicative of partial rotator cuff injury. Discussed nonoperative options such as CSI, oral medications, topical medications, rest, lifting restrictions or further imaging. Also discussed surgical option. At this time surgery would not be an option so held off on further imaging and surgical referral. Prefers not to take more medications and not interested in cortisone or trigger point injections for pain control. She has home exercises that are helping with range of motion. She does have barriers to transportation with parkinsons so unable to go regularly to PT. if she is not improving she will seek out PT in her area. Printed out referral for patient to take to local facility.   Can follow-up if no improvement as needed  Plan as below:   -Light range of motion and limited lifting for the next two weeks then can lift as tolerated by pain  -Can use 500mg aleve twice a day as needed for pain relief if needed. -Tylenol  500-1000mg (up to three times per day) as needed for pain relief.   -Physical therapy ordered for strengthening. Please schedule appointment if not improving with home exercises. This can be done at a [place] facility. They should call to schedule in 1-2 days. If wanting to go to closer outside facility. I also printed out order that can be taken outside of [place] system.     Michelle House DO  [facility]    -----  Chief Complaint   Patient presents with     Left Shoulder - Pain       SUBJECTIVE  Juanita Robbins is a/an 75 year old female who is seen in consultation at the request of  Shanda Bell C.N.P. for evaluation of left shoulder pain.     The patient is seen with their .  The patient is Left handed    Onset: 2-3 month(s) ago. Patient describes injury as fell on shoulder on ice.   Location of Pain: left posterior shoulder  Worsened by: reaching behind, overhead, abduction   Better with: home exercises  Treatments tried: home exercises  Associated symptoms: aching, sharp with movements.   Denies numbness, tingling, locking  Orthopedic/Surgical history: NO  Social History/Occupation: retired,     Patient Active Problem List   Diagnosis     Primary parkinsonism (H)       Current Outpatient Medications   Medication Sig Dispense Refill     atorvastatin (LIPITOR) 10 MG tablet Take 1 tablet (10 mg) by mouth daily. 90 tablet 0     losartan (COZAAR) 25 MG tablet Take 1 tablet (25 mg) by mouth daily 90 tablet 3     multivitamin (CENTRUM SILVER) tablet Take 1 tablet by mouth daily       pyRIDostigmine (MESTINON) 60 MG tablet Take 1.5 -2 pills at 4-5 PM as needed and tolerated. 180 tablet 1     RISEdronate (ACTONEL) 150 MG tablet Take 1 tablet (150 mg) by mouth every 30 days. 3 tablet 3       PMH, Medications and Allergies were reviewed and updated as needed.    REVIEW OF SYSTEMS:  10 point ROS is negative other than symptoms noted above in  HPI        OBJECTIVE:  LMP  (LMP Unknown)        General: healthy, alert and in no distress  Skin: no suspicious lesions or rash.  CV: distal perfusion intact  Resp: normal respiratory effort without conversational dyspnea   Psych: normal mood and affect  Gait: NORMAL  Neuro: Normal light sensory exam of UE bilaterally     LEFT SHOULDER  Inspection:    no swelling, bruising, discoloration, or obvious deformity or asymmetry  Palpation:    Tender about the anterior capsule and supraspinatus insertion and superior scapular border. No aC tenderness. Remainder of bony and tendinous landmarks are nontender.    Crepitus is Absent  Active Range of Motion:     Abduction 1650 / FF 1350 /  / IR hip pocket restricted and painful.   Strength:    Scapular plane abduction 4+/5 / ER 5-/5 / IR 4+/5 / biceps 5-/5  Special Tests:    Positive: supraspinatus (empty can)    Negative: lift-off, and Speed's        RADIOLOGY:  Final results and radiologist's interpretation, available in the [facility] health record.  Images were reviewed with the patient in the office today.    My personal interpretation of the performed imaging:     X-ray shoulder left3 views: 6/23/2025  left glenohumeral and AC joints are negative for acute fracture or dislocation.  Normal alignment. open joint space.Mild sclerosis of joint with inferior glenoid well corticated bony ossicle.                  Disclaimer: This note consists of symbols derived from keyboarding, dictation and/or voice recognition software. As a result, there may be errors in the script that have gone undetected. Please consider this when interpreting information found in this chart.     Again, thank you for allowing me to participate in the care of your patient.        Sincerely,        Michelle House, DO    Electronically signed

## 2025-06-23 NOTE — PATIENT INSTRUCTIONS
1. Chronic left shoulder pain      - rotator cuff injury and shoulder pain  -Light range of motion and limited lifting for the next two weeks then can lift as tolerated by pain  -Can use 500mg aleve twice a day as needed for pain relief if needed. -Tylenol 500-1000mg (up to three times per day) as needed for pain relief.   -Physical therapy ordered for strengthening. Please schedule appointment if not improving with home exercises. This can be done at a [place] facility. They should call to schedule in 1-2 days. If wanting to go to closer outside facility. I also printed out order that can be taken outside of [facility] system.       Thank you for choosing [facility]!  Please call [phone] and ask for my team if you have any questions or concerns    Michelle House DO  [facility]    Thank you for choosing [facility]!    CLINIC LOCATIONS:     [place]  TRIAGE LINE: [phone]   [address] [place] [address] APPOINTMENTS: [phone]   [place] [address] RADIOLOGY: [phone]   (Monday, Thursday & Friday) PHYSICAL THERAPY: [phone]    BILLING QUESTIONS: [phone]   [place] FAX: [phone] [address]    [place] [address]    (Wednesday)

## 2025-07-04 SDOH — HEALTH STABILITY: PHYSICAL HEALTH: ON AVERAGE, HOW MANY MINUTES DO YOU ENGAGE IN EXERCISE AT THIS LEVEL?: 20 MIN

## 2025-07-04 SDOH — HEALTH STABILITY: PHYSICAL HEALTH: ON AVERAGE, HOW MANY DAYS PER WEEK DO YOU ENGAGE IN MODERATE TO STRENUOUS EXERCISE (LIKE A BRISK WALK)?: 4 DAYS

## 2025-07-04 ASSESSMENT — SOCIAL DETERMINANTS OF HEALTH (SDOH): HOW OFTEN DO YOU GET TOGETHER WITH FRIENDS OR RELATIVES?: ONCE A WEEK

## 2025-07-08 ASSESSMENT — PATIENT HEALTH QUESTIONNAIRE - PHQ9
10. IF YOU CHECKED OFF ANY PROBLEMS, HOW DIFFICULT HAVE THESE PROBLEMS MADE IT FOR YOU TO DO YOUR WORK, TAKE CARE OF THINGS AT HOME, OR GET ALONG WITH OTHER PEOPLE: SOMEWHAT DIFFICULT
SUM OF ALL RESPONSES TO PHQ QUESTIONS 1-9: 17
SUM OF ALL RESPONSES TO PHQ QUESTIONS 1-9: 17

## 2025-07-09 ENCOUNTER — OFFICE VISIT (OUTPATIENT)
Dept: FAMILY MEDICINE | Facility: CLINIC | Age: 75
End: 2025-07-09
Payer: MEDICARE

## 2025-07-09 VITALS
DIASTOLIC BLOOD PRESSURE: 72 MMHG | TEMPERATURE: 97.8 F | HEART RATE: 85 BPM | BODY MASS INDEX: 22.89 KG/M2 | OXYGEN SATURATION: 99 % | WEIGHT: 135 LBS | SYSTOLIC BLOOD PRESSURE: 130 MMHG

## 2025-07-09 DIAGNOSIS — F33.1 MODERATE EPISODE OF RECURRENT MAJOR DEPRESSIVE DISORDER (H): ICD-10-CM

## 2025-07-09 DIAGNOSIS — M81.0 AGE-RELATED OSTEOPOROSIS WITHOUT CURRENT PATHOLOGICAL FRACTURE: ICD-10-CM

## 2025-07-09 DIAGNOSIS — Z00.00 ENCOUNTER FOR MEDICARE ANNUAL WELLNESS EXAM: Primary | ICD-10-CM

## 2025-07-09 DIAGNOSIS — E78.5 HYPERLIPIDEMIA LDL GOAL <100: ICD-10-CM

## 2025-07-09 DIAGNOSIS — I10 BENIGN ESSENTIAL HYPERTENSION: ICD-10-CM

## 2025-07-09 DIAGNOSIS — G20.C PRIMARY PARKINSONISM (H): ICD-10-CM

## 2025-07-09 LAB
ALBUMIN SERPL BCG-MCNC: 4.6 G/DL (ref 3.5–5.2)
ALP SERPL-CCNC: 75 U/L (ref 40–150)
ALT SERPL W P-5'-P-CCNC: 15 U/L (ref 0–50)
ANION GAP SERPL CALCULATED.3IONS-SCNC: 10 MMOL/L (ref 7–15)
AST SERPL W P-5'-P-CCNC: 32 U/L (ref 0–45)
BILIRUB SERPL-MCNC: 0.8 MG/DL
BUN SERPL-MCNC: 15.1 MG/DL (ref 8–23)
CALCIUM SERPL-MCNC: 10.6 MG/DL (ref 8.8–10.4)
CHLORIDE SERPL-SCNC: 103 MMOL/L (ref 98–107)
CHOLEST SERPL-MCNC: 185 MG/DL
CREAT SERPL-MCNC: 0.71 MG/DL (ref 0.51–0.95)
EGFRCR SERPLBLD CKD-EPI 2021: 88 ML/MIN/1.73M2
FASTING STATUS PATIENT QL REPORTED: NO
FASTING STATUS PATIENT QL REPORTED: NO
GLUCOSE SERPL-MCNC: 109 MG/DL (ref 70–99)
HCO3 SERPL-SCNC: 27 MMOL/L (ref 22–29)
HDLC SERPL-MCNC: 82 MG/DL
LDLC SERPL CALC-MCNC: 86 MG/DL
NONHDLC SERPL-MCNC: 103 MG/DL
POTASSIUM SERPL-SCNC: 4.8 MMOL/L (ref 3.4–5.3)
PROT SERPL-MCNC: 7.5 G/DL (ref 6.4–8.3)
SODIUM SERPL-SCNC: 140 MMOL/L (ref 135–145)
TRIGL SERPL-MCNC: 87 MG/DL

## 2025-07-09 PROCEDURE — 80053 COMPREHEN METABOLIC PANEL: CPT

## 2025-07-09 PROCEDURE — 3078F DIAST BP <80 MM HG: CPT

## 2025-07-09 PROCEDURE — 99214 OFFICE O/P EST MOD 30 MIN: CPT | Mod: 25

## 2025-07-09 PROCEDURE — 1126F AMNT PAIN NOTED NONE PRSNT: CPT

## 2025-07-09 PROCEDURE — 80061 LIPID PANEL: CPT

## 2025-07-09 PROCEDURE — 36415 COLL VENOUS BLD VENIPUNCTURE: CPT

## 2025-07-09 PROCEDURE — 3048F LDL-C <100 MG/DL: CPT

## 2025-07-09 PROCEDURE — G0439 PPPS, SUBSEQ VISIT: HCPCS

## 2025-07-09 PROCEDURE — 3075F SYST BP GE 130 - 139MM HG: CPT

## 2025-07-09 RX ORDER — LOSARTAN POTASSIUM 25 MG/1
25 TABLET ORAL DAILY
Qty: 90 TABLET | Refills: 3 | Status: SHIPPED | OUTPATIENT
Start: 2025-07-09

## 2025-07-09 RX ORDER — ATORVASTATIN CALCIUM 10 MG/1
10 TABLET, FILM COATED ORAL DAILY
Qty: 90 TABLET | Refills: 0 | Status: SHIPPED | OUTPATIENT
Start: 2025-07-09

## 2025-07-09 RX ORDER — CARBIDOPA AND LEVODOPA 25; 100 MG/1; MG/1
1 TABLET ORAL
COMMUNITY

## 2025-07-09 RX ORDER — RISEDRONATE SODIUM 150 MG/1
150 TABLET, FILM COATED ORAL
Qty: 3 TABLET | Refills: 3 | Status: SHIPPED | OUTPATIENT
Start: 2025-07-09

## 2025-07-09 ASSESSMENT — PAIN SCALES - GENERAL: PAINLEVEL_OUTOF10: NO PAIN (0)

## 2025-07-09 NOTE — PATIENT INSTRUCTIONS
Patient Education   Preventive Care Advice   This is general advice given by our system to help you stay healthy. However, your care team may have specific advice just for you. Please talk to your care team about your preventive care needs.  Folic acid: Get a 2 mg dose to take in addition  Vitamin B6: 25 mg in addition  Nutrition  Eat 5 or more servings of fruits and vegetables each day.  Try wheat bread, brown rice and whole grain pasta (instead of white bread, rice, and pasta).  Get enough calcium and vitamin D. Check the label on foods and aim for 100% of the RDA (recommended daily allowance).  Lifestyle  Exercise at least 150 minutes each week  (30 minutes a day, 5 days a week).  Do muscle strengthening activities 2 days a week. These help control your weight and prevent disease.  No smoking.  Wear sunscreen to prevent skin cancer.  Have a dental exam and cleaning every 6 months.  Yearly exams  See your health care team every year to talk about:  Any changes in your health.  Any medicines your care team has prescribed.  Preventive care, family planning, and ways to prevent chronic diseases.  Shots (vaccines)   HPV shots (up to age 26), if you've never had them before.  Hepatitis B shots (up to age 59), if you've never had them before.  COVID-19 shot: Get this shot when it's due.  Flu shot: Get a flu shot every year.  Tetanus shot: Get a tetanus shot every 10 years.  Pneumococcal, hepatitis A, and RSV shots: Ask your care team if you need these based on your risk.  Shingles shot (for age 50 and up)  General health tests  Diabetes screening:  Starting at age 35, Get screened for diabetes at least every 3 years.  If you are younger than age 35, ask your care team if you should be screened for diabetes.  Cholesterol test: At age 39, start having a cholesterol test every 5 years, or more often if advised.  Bone density scan (DEXA): At age 50, ask your care team if you should have this scan for osteoporosis (brittle  bones).  Hepatitis C: Get tested at least once in your life.  STIs (sexually transmitted infections)  Before age 24: Ask your care team if you should be screened for STIs.  After age 24: Get screened for STIs if you're at risk. You are at risk for STIs (including HIV) if:  You are sexually active with more than one person.  You don't use condoms every time.  You or a partner was diagnosed with a sexually transmitted infection.  If you are at risk for HIV, ask about PrEP medicine to prevent HIV.  Get tested for HIV at least once in your life, whether you are at risk for HIV or not.  Cancer screening tests  Cervical cancer screening: If you have a cervix, begin getting regular cervical cancer screening tests starting at age 21.  Breast cancer scan (mammogram): If you've ever had breasts, begin having regular mammograms starting at age 40. This is a scan to check for breast cancer.  Colon cancer screening: It is important to start screening for colon cancer at age 45.  Have a colonoscopy test every 10 years (or more often if you're at risk) Or, ask your provider about stool tests like a FIT test every year or Cologuard test every 3 years.  To learn more about your testing options, visit:   .  For help making a decision, visit:   https://bit.ly/xt88576.  Prostate cancer screening test: If you have a prostate, ask your care team if a prostate cancer screening test (PSA) at age 55 is right for you.  Lung cancer screening: If you are a current or former smoker ages 50 to 80, ask your care team if ongoing lung cancer screenings are right for you.  For informational purposes only. Not to replace the advice of your health care provider. Copyright   2023 [facility]. All rights reserved. Clinically reviewed by the [facility]. [employer] 020641 - REV 01/24.  Learning About Activities of Daily Living  What are activities of daily living?     Activities of daily living (ADLs) are the basic  self-care tasks you do every day. These include eating, bathing, dressing, and moving around.  As you age, and if you have health problems, you may find that it's harder to do some of these tasks. If so, your doctor can suggest ideas that may help.  To measure what kind of help you may need, your doctor will ask how well you are able to do ADLs. Let your doctor know if there are any tasks that you are having trouble doing. This is an important first step to getting help. And when you have the help you need, you can stay as independent as possible.  How will a doctor assess your ADLs?  Asking about ADLs is part of a routine health checkup your doctor will likely do as you age. Your health check might be done in a doctor's office, in your home, or at a hospital. The goal is to find out if you are having any problems that could make it hard to care for yourself or that make it unsafe for you to be on your own.  To measure your ADLs, your doctor will ask how hard it is for you to do routine tasks. Your doctor may also want to know if you have changed the way you do a task because of a health problem. Your doctor may watch how you:  Walk back and forth.  Keep your balance while you stand or walk.  Move from sitting to standing or from a bed to a chair.  Button or unbutton a shirt or sweater.  Remove and put on your shoes.  It's common to feel a little worried or anxious if you find you can't do all the things you used to be able to do. Talking with your doctor about ADLs is a way to make sure you're as safe as possible and able to care for yourself as well as you can. You may want to bring a caregiver, friend, or family member to your checkup. They can help you talk to your doctor.  Follow-up care is a key part of your treatment and safety. Be sure to make and go to all appointments, and call your doctor if you are having problems. It's also a good idea to know your test results and keep a list of the medicines you  take.  Current as of: October 24, 2024  Content Version: 14.5    5889-1146 [employer].   Care instructions adapted under license by your healthcare professional. If you have questions about a medical condition or this instruction, always ask your healthcare professional. [employer] disclaims any warranty or liability for your use of this information.    Preventing Falls: Care Instructions  Injuries and health problems such as trouble walking or poor eyesight can increase your risk of falling. So can some medicines. But there are things you can do to help prevent falls. You can exercise to get stronger. You can also arrange your home to make it safer.    Talk to your doctor about the medicines you take. Ask if any of them increase the risk of falls and whether they can be changed or stopped.   Try to exercise regularly. It can help improve your strength and balance. This can help lower your risk of falling.         Practice fall safety and prevention.   Wear low-heeled shoes that fit well and give your feet good support. Talk to your doctor if you have foot problems that make this hard.  Carry a cellphone or wear a medical alert device that you can use to call for help.  Use stepladders instead of chairs to reach high objects. Don't climb if you're at risk for falls. Ask for help, if needed.  Wear the correct eyeglasses, if you need them.        Make your home safer.   Remove rugs, cords, clutter, and furniture from walkways.  Keep your house well lit. Use night-lights in hallways and bathrooms.  Install and use sturdy handrails on stairways.  Wear nonskid footwear, even inside. Don't walk barefoot or in socks without shoes.        Be safe outside.   Use handrails, curb cuts, and ramps whenever possible.  Keep your hands free by using a shoulder bag or backpack.  Try to walk in well-lit areas. Watch out for uneven ground, changes in pavement, and debris.  Be careful in the winter. Walk on the  "grass or gravel when sidewalks are slippery. Use de-icer on steps and walkways. Add non-slip devices to shoes.    Put grab bars and nonskid mats in your shower or tub and near the toilet. Try to use a shower chair or bath bench when bathing.   Get into a tub or shower by putting in your weaker leg first. Get out with your strong side first. Have a phone or medical alert device in the bathroom with you.   Where can you learn more?  Go to https://www.LaunchBit.net/patiented  Enter G117 in the search box to learn more about \"Preventing Falls: Care Instructions.\"  Current as of: July 31, 2024  Content Version: 14.5 2024-2025 [employer].   Care instructions adapted under license by your healthcare professional. If you have questions about a medical condition or this instruction, always ask your healthcare professional. [employer] disclaims any warranty or liability for your use of this information.    Learning About Stress  What is stress?     Stress is your body's response to a hard situation. Your body can have a physical, emotional, or mental response. Stress is a fact of life for most people, and it affects everyone differently. What causes stress for you may not be stressful for someone else.  A lot of things can cause stress. You may feel stress when you go on a job interview, take a test, or run a race. This kind of short-term stress is normal and even useful. It can help you if you need to work hard or react quickly. For example, stress can help you finish an important job on time.  Long-term stress is caused by ongoing stressful situations or events. Examples of long-term stress include long-term health problems, ongoing problems at work, or conflicts in your family. Long-term stress can harm your health.  How does stress affect your health?  When you are stressed, your body responds as though you are in danger. It makes hormones that speed up your heart, make you breathe faster, and " give you a burst of energy. This is called the fight-or-flight stress response. If the stress is over quickly, your body goes back to normal and no harm is done.  But if stress happens too often or lasts too long, it can have bad effects. Long-term stress can make you more likely to get sick, and it can make symptoms of some diseases worse. If you tense up when you are stressed, you may develop neck, shoulder, or low back pain. Stress is linked to high blood pressure and heart disease.  Stress also harms your emotional health. It can make you stafford, tense, or depressed. Your relationships may suffer, and you may not do well at work or school.  What can you do to manage stress?  You can try these things to help manage stress:   Do something active. Exercise or activity can help reduce stress. Walking is a great way to get started. Even everyday activities such as housecleaning or yard work can help.  Try yoga or orlando chi. These techniques combine exercise and meditation. You may need some training at first to learn them.  Do something you enjoy. For example, listen to music or go to a movie. Practice your hobby or do volunteer work.  Meditate. This can help you relax, because you are not worrying about what happened before or what may happen in the future.  Do guided imagery. Imagine yourself in any setting that helps you feel calm. You can use online videos, books, or a teacher to guide you.  Do breathing exercises. For example:  From a standing position, bend forward from the waist with your knees slightly bent. Let your arms dangle close to the floor.  Breathe in slowly and deeply as you return to a standing position. Roll up slowly and lift your head last.  Hold your breath for just a few seconds in the standing position.  Breathe out slowly and bend forward from the waist.  Let your feelings out. Talk, laugh, cry, and express anger when you need to. Talking with supportive friends or family, a counselor, or a  "jennifer leader about your feelings is a healthy way to relieve stress. Avoid discussing your feelings with people who make you feel worse.  Write. It may help to write about things that are bothering you. This helps you find out how much stress you feel and what is causing it. When you know this, you can find better ways to cope.  What can you do to prevent stress?  You might try some of these things to help prevent stress:  Manage your time. This helps you find time to do the things you want and need to do.  Get enough sleep. Your body recovers from the stresses of the day while you are sleeping.  Get support. Your family, friends, and community can make a difference in how you experience stress.  Limit your news feed. Avoid or limit time on social media or news that may make you feel stressed.  Do something active. Exercise or activity can help reduce stress. Walking is a great way to get started.  Where can you learn more?  Go to https://www.Clever Machine.net/patiented  Enter N032 in the search box to learn more about \"Learning About Stress.\"  Current as of: October 24, 2024  Content Version: 14.5    6855-3272 [employer].   Care instructions adapted under license by your healthcare professional. If you have questions about a medical condition or this instruction, always ask your healthcare professional. [employer] disclaims any warranty or liability for your use of this information.    Learning About Sleeping Well  What does sleeping well mean?     Sleeping well means getting enough sleep to feel good and stay healthy. How much sleep is enough varies among people.  The number of hours you sleep and how you feel when you wake up are both important. If you do not feel refreshed, you probably need more sleep. Another sign of not getting enough sleep is feeling tired during the day.  Experts recommend that adults get at least 7 or more hours of sleep per day. Children and older adults need more " "sleep.  Why is getting enough sleep important?  Getting enough quality sleep is a basic part of good health. When your sleep suffers, your physical health, mood, and your thoughts can suffer too. You may find yourself feeling more grumpy or stressed. Not getting enough sleep also can lead to serious problems, including injury, accidents, anxiety, and depression.  What might cause poor sleeping?  Many things can cause sleep problems, including:  Changes to your sleep schedule.  Stress. Stress can be caused by fear about a single event, such as giving a speech. Or you may have ongoing stress, such as worry about work or school.  Depression, anxiety, and other mental or emotional conditions.  Changes in your sleep habits or surroundings. This includes changes that happen where you sleep, such as noise, light, or sleeping in a different bed. It also includes changes in your sleep pattern, such as having jet lag or working a late shift.  Health problems, such as pain, breathing problems, and restless legs syndrome.  Lack of regular exercise.  Using alcohol, nicotine, or caffeine before bed.  How can you help yourself?  Here are some tips that may help you sleep more soundly and wake up feeling more refreshed.  Your sleeping area   Use your bedroom only for sleeping and sex. A bit of light reading may help you fall asleep. But if it doesn't, do your reading elsewhere in the house. Try not to use your TV, computer, smartphone, or tablet while you are in bed.  Be sure your bed is big enough to stretch out comfortably, especially if you have a sleep partner.  Keep your bedroom quiet, dark, and cool. Use curtains, blinds, or a sleep mask to block out light. To block out noise, use earplugs, soothing music, or a \"white noise\" machine.  Your evening and bedtime routine   Create a relaxing bedtime routine. You might want to take a warm shower or bath, or listen to soothing music.  Go to bed at the same time every night. And get " "up at the same time every morning, even if you feel tired.  What to avoid   Limit caffeine (coffee, tea, caffeinated sodas) during the day, and don't have any for at least 6 hours before bedtime.  Avoid drinking alcohol before bedtime. Alcohol can cause you to wake up more often during the night.  Try not to smoke or use tobacco, especially in the evening. Nicotine can keep you awake.  Limit naps during the day, especially close to bedtime.  Avoid lying in bed awake for too long. If you can't fall asleep or if you wake up in the middle of the night and can't get back to sleep within about 20 minutes, get out of bed and go to another room until you feel sleepy.  Avoid taking medicine right before bed that may keep you awake or make you feel hyper or energized. Your doctor can tell you if your medicine may do this and if you can take it earlier in the day.  If you can't sleep   Imagine yourself in a peaceful, pleasant scene. Focus on the details and feelings of being in a place that is relaxing.  Get up and do a quiet or boring activity until you feel sleepy.  Avoid drinking any liquids before going to bed to help prevent waking up often to use the bathroom.  Where can you learn more?  Go to https://www.Saqina.net/patiented  Enter J942 in the search box to learn more about \"Learning About Sleeping Well.\"  Current as of: July 31, 2024  Content Version: 14.5 2024-2025 [employer].   Care instructions adapted under license by your healthcare professional. If you have questions about a medical condition or this instruction, always ask your healthcare professional. [employer] disclaims any warranty or liability for your use of this information.    Bladder Training: Care Instructions  Your Care Instructions     Bladder training is used to treat urge incontinence and stress incontinence. Urge incontinence means that the need to urinate comes on so fast that you can't get to a toilet in time. " Stress incontinence means that you leak urine because of pressure on your bladder. For example, it may happen when you laugh, cough, or lift something heavy.  Bladder training can increase how long you can wait before you have to urinate. It can also help your bladder hold more urine. And it can give you better control over the urge to urinate.  It is important to remember that bladder training takes a few weeks to a few months to make a difference. You may not see results right away, but don't give up.  Follow-up care is a key part of your treatment and safety. Be sure to make and go to all appointments, and call your doctor if you are having problems. It's also a good idea to know your test results and keep a list of the medicines you take.  How can you care for yourself at home?  Work with your doctor to come up with a bladder training program that is right for you. You may use one or more of the following methods.  Delayed urination  In the beginning, try to keep from urinating for 5 minutes after you first feel the need to go.  While you wait, take deep, slow breaths to relax. Kegel exercises can also help you delay the need to go to the bathroom.  After some practice, when you can easily wait 5 minutes to urinate, try to wait 10 minutes before you urinate.  Slowly increase the waiting period until you are able to control when you have to urinate.  Scheduled urination  Empty your bladder when you first wake up in the morning.  Schedule times throughout the day when you will urinate.  Start by going to the bathroom every hour, even if you don't need to go.  Slowly increase the time between trips to the bathroom.  When you have found a schedule that works well for you, keep doing it.  If you wake up during the night and have to urinate, do it. Apply your schedule to waking hours only.  Kegel exercises  These tighten and strengthen pelvic muscles, which can help you control the flow of urine. (If doing these  "exercises causes pain, stop doing them and talk with your doctor.) To do Kegel exercises:  Squeeze your muscles as if you were trying not to pass gas. Or squeeze your muscles as if you were stopping the flow of urine. Your belly, legs, and buttocks shouldn't move.  Hold the squeeze for 3 seconds, then relax for 5 to 10 seconds.  Start with 3 seconds, then add 1 second each week until you are able to squeeze for 10 seconds.  Repeat the exercise 10 times a session. Do 3 to 8 sessions a day.  When should you call for help?  Watch closely for changes in your health, and be sure to contact your doctor if:    Your incontinence is getting worse.     You do not get better as expected.   Where can you learn more?  Go to https://www.2Duche.net/patiented  Enter V684 in the search box to learn more about \"Bladder Training: Care Instructions.\"  Current as of: April 30, 2024  Content Version: 14.5    1060-1860 [email or web address].   Care instructions adapted under license by your healthcare professional. If you have questions about a medical condition or this instruction, always ask your healthcare professional. [email or web address] disclaims any warranty or liability for your use of this information.    Learning About Depression Screening  What is depression screening?  Depression screening is a way to see if you have depression symptoms. It may be done by a doctor or counselor. It's often part of a routine checkup. That's because your mental health is just as important as your physical health.  Depression is a mental health condition that affects how you feel, think, and act. You may:  Have less energy.  Lose interest in your daily activities.  Feel sad and grouchy for a long time.  Depression is very common. It affects people of all ages.  Many things can lead to depression. Some people become depressed after they have a stroke or find out they have a major illness like cancer or heart disease. The death of a loved " "one or a breakup may lead to depression. It can run in families. Most experts believe that a combination of inherited genes and stressful life events can cause it.  What happens during screening?  You may be asked to fill out a form about your depression symptoms. You and the doctor will discuss your answers. The doctor may ask you more questions to learn more about how you think, act, and feel.  What happens after screening?  If you have symptoms of depression, your doctor will talk to you about your options.  Doctors usually treat depression with medicines or counseling. Often, combining the two works best. Many people don't get help because they think that they'll get over the depression on their own. But people with depression may not get better unless they get treatment.  The cause of depression is not well understood. There may be many factors involved. But if you have depression, it's not your fault.  A serious symptom of depression is thinking about death or suicide. If you or someone you care about talks about this or about feeling hopeless, get help right away.  It's important to know that depression can be treated. Medicine, counseling, and self-care may help.  Where can you learn more?  Go to https://www.BackerKit.net/patiented  Enter T185 in the search box to learn more about \"Learning About Depression Screening.\"  Current as of: July 31, 2024  Content Version: 14.5 2024-2025 [employer].   Care instructions adapted under license by your healthcare professional. If you have questions about a medical condition or this instruction, always ask your healthcare professional. [employer] disclaims any warranty or liability for your use of this information.       "

## 2025-07-09 NOTE — PROGRESS NOTES
Answers submitted by the patient for this visit:  Patient Health Questionnaire (Submitted on 7/8/2025)  If you checked off any problems, how difficult have these problems made it for you to do your work, take care of things at home, or get along with other people?: Somewhat difficult  PHQ9 TOTAL SCORE: 17  Preventive Care Visit  [facility]  LILIANA Thompson CNP, Family Medicine  Jul 9, 2025      Assessment & Plan     Encounter for Medicare annual wellness exam  On exam, pleasant 75 year old patient. History of   Past Medical History:   Diagnosis Date    Hypertension 2018    Take meds for this    Parkinson disease (H)    No acute or concerning findings on today's physical exam. Vital signs at goal.   Repeat bone scan in two years. Perform cholesterol recheck, liver, and kidney function tests today. Scheduled a Medicare visit in one year. Discussed long-term decisions regarding potential future care needs, including the possibility of a feeding tube, with family and patient. POLST form given.   Passed gait test today, though does sound like she used her  to help, offered walker/cane and she declined they are doing well with her utilizing him as needed.    Hyperlipidemia LDL goal <100  Stable. Continue with current management without changes.  Discussed healthy diet and lifestyle.  No aches or pains.   CMP Ordered  Repeat lipid Ordered  - atorvastatin (LIPITOR) 10 MG tablet; Take 1 tablet (10 mg) by mouth daily.  - Lipid panel reflex to direct LDL Non-fasting  - Comprehensive metabolic panel (BMP + Alb, Alk Phos, ALT, AST, Total. Bili, TP)    Benign essential hypertension  Controlled. Continue current medications. No change in management. No symptoms of chest pain, palpitations, dizziness, headaches, vision changes, SOB, orthopnea or peripheral edema.   Increase dietary efforts and physical activity.  - losartan (COZAAR) 25 MG tablet; Take 1 tablet (25 mg) by mouth  daily.    Age-related osteoporosis without current pathological fracture  Continue with dose monthly, increase time of sitting up after taking to 1 hour.  Repeat DEXA scan next year, sooner if fracture.  - RISEdronate (ACTONEL) 150 MG tablet; Take 1 tablet (150 mg) by mouth every 30 days.    Primary parkinsonism (H)  Chronic, currently working with [facility] to rule out other etiologies.  Continues on carbidopa levodopa.  No acute worsening of symptoms but continues to struggle with symptoms of emotional liability, some confusion, difficulty with handwriting, difficulty with gait at times.  Plans to continue working with [employer] but will follow with Dr. Serrano.     Moderate episode of recurrent major depressive disorder (H)  Elevated PHQ-9 today at 17.  Discussed, patient becomes tearful.  She does feel like it is related to the diagnosis of Parkinson's, feels she is managing well.   does voice that he feels maybe she is not doing well.  We talked about options today and I encouraged him to talk at home and reach out if they would like any resources for talk therapy or start on medications.  They are agreeable to this.  No thoughts of suicide or self-harm.    Depression Screening Follow Up        7/8/2025    11:40 AM   PHQ   PHQ-9 Total Score 17    Q9: Thoughts of better off dead/self-harm past 2 weeks Not at all       Patient-reported     Follow Up Actions Taken  Crisis resource information provided in After Visit Summary  Patient declined referral.     Counseling  Appropriate preventive services were addressed with this patient via screening, questionnaire, or discussion as appropriate for fall prevention, nutrition, physical activity, Tobacco-use cessation, social engagement, weight loss and cognition.  Checklist reviewing preventive services available has been given to the patient.  Reviewed patient's diet, addressing concerns and/or questions.   She is at risk for psychosocial distress and has been provided  with information to reduce risk.   Discussed possible causes of fatigue. Updated plan of care.  Patient reported difficulty with activities of daily living were addressed today.Information on urinary incontinence and treatment options given to patient.   The patient's PHQ-9 score is consistent with moderate depression. She was provided with information regarding depression.   Reviewed preventive health counseling, as reflected in patient instructions    Subjective   Ana is a 75 year old, presenting for the following:  Annual Visit (Pt dx with parkinson's about 1 year ago-  states did dex scan in [place], saw neurologist in [place] yesterday- going back for testing oct 2 2025)        7/9/2025     8:46 AM   Additional Questions   Roomed by Karla   Accompanied by          7/9/2025     8:46 AM   Patient Reported Additional Medications   Patient reports taking the following new medications none          HPI    Ana Robbins, 75-year-old female    - History of Parkinson's disease with chronic symptoms including emotional lability, confusion, difficulty with typing, and walking difficulties. No recent changes in Parkinson's symptoms.  Went to [place] and saw a provider down there who agrees it is likely Parkinson's, is going to do further workup to ensure no myasthenia gravis.  Takes carbidopa levodopa but has no improvement in symptoms.    - Reports feeling down and very emotional, with these symptoms present for a long time. She feels she is managing well and doesn't need any medications or resources.     - No chest pain, headaches, or trouble breathing reported. No swelling of ankles    -Did see orthopedics for shoulder pain, plans to do PT.    - History of low appetite, occasional weight fluctuations, but no significant recent weight loss reported.   is concerned because she does not eat lunch.    - No issues with vision reported    -Overall feeling okay.     Advance Care Planning  Discussed  advance care planning with patient; informed AVS has link to [employer].        7/4/2025   General Health   How would you rate your overall physical health? Good   Feel stress (tense, anxious, or unable to sleep) To some extent   (!) STRESS CONCERN      7/4/2025   Nutrition   Diet: Regular (no restrictions)         7/4/2025   Exercise   Days per week of moderate/strenous exercise 4 days   Average minutes spent exercising at this level 20 min         7/4/2025   Social Factors   Frequency of gathering with friends or relatives Once a week   Worry food won't last until get money to buy more No   Food not last or not have enough money for food? No   Do you have housing? (Housing is defined as stable permanent housing and does not include staying outside in a car, in a tent, in an abandoned building, in an overnight shelter, or couch-surfing.) Yes   Are you worried about losing your housing? No   Lack of transportation? No   Unable to get utilities (heat,electricity)? No         7/9/2025   Fall Risk   Gait Speed Test (Document in seconds) 4.38   Gait Speed Test Interpretation Less than or equal to 5.00 seconds - PASS          7/4/2025   Activities of Daily Living- Home Safety   Needs help with the following daily activites Transportation   Do you have the help that you need? Yes for Some   Safety concerns in the home None of the above         7/4/2025   Dental   Dentist two times every year? Yes         7/4/2025   Hearing Screening   Hearing concerns? None of the above         7/4/2025   Driving Risk Screening   Patient/family members have concerns about driving No         7/4/2025   General Alertness/Fatigue Screening   Have you been more tired than usual lately? (!) YES         7/4/2025   Urinary Incontinence Screening   Bothered by leaking urine in past 6 months Yes     Today's PHQ-9 Score:       7/8/2025    11:40 AM   PHQ-9 SCORE   PHQ-9 Total Score MyChart 17 (Moderately severe depression)   PHQ-9 Total Score  17        Patient-reported         7/4/2025   Substance Use   Alcohol more than 3/day or more than 7/wk Not Applicable   Do you have a current opioid prescription? No   How severe/bad is pain from 1 to 10? 0/10 (No Pain)   Do you use any other substances recreationally? No     Social History     Tobacco Use    Smoking status: Never     Passive exposure: Never    Smokeless tobacco: Never   Vaping Use    Vaping status: Never Used   Substance Use Topics    Alcohol use: Yes     Comment: 1 drink a couple of times a year.    Drug use: Never          Mammogram Screening - After age 74- determine frequency with patient based on health status, life expectancy and patient goals    ASCVD Risk   The 10-year ASCVD risk score (Catherine DK, et al., 2019) is: 21.4%    Values used to calculate the score:      Age: 75 years      Sex: Female      Is Non- : No      Diabetic: No      Tobacco smoker: No      Systolic Blood Pressure: 130 mmHg      Is BP treated: Yes      HDL Cholesterol: 74 mg/dL      Total Cholesterol: 167 mg/dL    Reviewed and updated as needed this visit by Provider       Med Hx  Surg Hx             Current providers sharing in care for this patient include:  Patient Care Team:  Shanda Bell APRN CNP as PCP - General (Family Medicine)  Kervin Serrano MD as MD (Neurology)  Kervin Serrano MD as Assigned Neuroscience Provider  Vikki Gilliland Psy.D, LP as Psychologist (Neuropsychology)  Shanda Bell APRN CNP as Assigned PCP    The following health maintenance items are reviewed in Epic and correct as of today:  Health Maintenance   Topic Date Due    BMP  07/01/2025    LIPID  07/01/2025    MEDICARE ANNUAL WELLNESS VISIT  07/01/2025    INFLUENZA VACCINE (1) 09/01/2025    COLORECTAL CANCER SCREENING  01/01/2026    ANNUAL REVIEW OF HM ORDERS  06/18/2026    FALL RISK ASSESSMENT  07/09/2026    DIABETES SCREENING  07/01/2027    ADVANCE CARE PLANNING  07/22/2029    DTAP/TDAP/TD VACCINE  "(2 - Td or Tdap) 08/15/2033    DEXA  07/30/2039    HEPATITIS C SCREENING  Completed    PHQ-2 (once per calendar year)  Completed    PNEUMOCOCCAL VACCINE 50+ YEARS  Completed    ZOSTER VACCINE  Completed    RSV VACCINE  Completed    COVID-19 VACCINE  Completed    HPV VACCINE  Aged Out    MENINGITIS VACCINE  Aged Out    MAMMO SCREENING  Discontinued        Objective    Exam  /72 (BP Location: Right arm, Patient Position: Sitting, Cuff Size: Adult Regular)   Pulse 85   Temp 97.8  F (36.6  C) (Oral)   Wt 61.2 kg (135 lb)   LMP  (LMP Unknown)   SpO2 99%   BMI 22.89 kg/m     Estimated body mass index is 22.89 kg/m  as calculated from the following:    Height as of 6/18/25: 1.636 m (5' 4.4\").    Weight as of this encounter: 61.2 kg (135 lb).    Physical Exam  Vitals reviewed.   Constitutional:       General: She is not in acute distress.  HENT:      Right Ear: Tympanic membrane, ear canal and external ear normal.      Left Ear: Tympanic membrane, ear canal and external ear normal.   Eyes:      Extraocular Movements: Extraocular movements intact.      Pupils: Pupils are equal, round, and reactive to light.   Neck:      Thyroid: No thyroid mass, thyromegaly or thyroid tenderness.      Vascular: No carotid bruit.   Cardiovascular:      Rate and Rhythm: Normal rate and regular rhythm.      Pulses:           Radial pulses are 2+ on the right side and 2+ on the left side.        Posterior tibial pulses are 2+ on the right side and 2+ on the left side.      Heart sounds: Normal heart sounds. No murmur heard.  Pulmonary:      Effort: Pulmonary effort is normal. No respiratory distress.      Breath sounds: No wheezing.   Abdominal:      General: Abdomen is flat. Bowel sounds are normal. There is no distension.   Musculoskeletal:         General: Normal range of motion.      Cervical back: Normal range of motion. No rigidity. Normal range of motion.      Right lower leg: No edema.      Left lower leg: No edema. "   Lymphadenopathy:      Cervical: No cervical adenopathy.   Neurological:      General: No focal deficit present.      Mental Status: She is alert.      Cranial Nerves: No cranial nerve deficit.      Sensory: No sensory deficit.      Motor: No weakness.      Gait: Gait normal.   Psychiatric:         Mood and Affect: Mood normal.         Thought Content: Thought content normal.       Gait and balance assessed per Gait Speed Test.  Result as above.        7/9/2025   Mini Cog   Clock Draw Score 0 Abnormal   3 Item Recall 3 objects recalled   Mini Cog Total Score 3     At the end of the visit, I confirmed understanding of what was discussed. Juanita has no further questions or concerns that were brought up at this time.      Brandy Bell DNP, APRN, FNP-C

## 2025-07-14 ENCOUNTER — OFFICE VISIT (OUTPATIENT)
Dept: NEUROLOGY | Facility: CLINIC | Age: 75
End: 2025-07-14
Attending: PSYCHIATRY & NEUROLOGY
Payer: MEDICARE

## 2025-07-14 ENCOUNTER — MYC MEDICAL ADVICE (OUTPATIENT)
Dept: FAMILY MEDICINE | Facility: CLINIC | Age: 75
End: 2025-07-14

## 2025-07-14 DIAGNOSIS — G20.C PRIMARY PARKINSONISM (H): ICD-10-CM

## 2025-07-14 DIAGNOSIS — R41.89 SUBJECTIVE MEMORY COMPLAINTS: ICD-10-CM

## 2025-07-14 DIAGNOSIS — G20.A1: Primary | ICD-10-CM

## 2025-07-14 DIAGNOSIS — F43.21 ADJUSTMENT DISORDER WITH DEPRESSED MOOD: ICD-10-CM

## 2025-07-14 DIAGNOSIS — F33.0 MILD EPISODE OF RECURRENT MAJOR DEPRESSIVE DISORDER: Primary | ICD-10-CM

## 2025-07-14 DIAGNOSIS — F06.70: Primary | ICD-10-CM

## 2025-07-14 PROCEDURE — 96132 NRPSYC TST EVAL PHYS/QHP 1ST: CPT | Performed by: CLINICAL NEUROPSYCHOLOGIST

## 2025-07-14 PROCEDURE — 96121 NUBHVL XM PHY/QHP EA ADDL HR: CPT | Performed by: CLINICAL NEUROPSYCHOLOGIST

## 2025-07-14 PROCEDURE — 96116 NUBHVL XM PHYS/QHP 1ST HR: CPT | Performed by: CLINICAL NEUROPSYCHOLOGIST

## 2025-07-14 PROCEDURE — 96138 PSYCL/NRPSYC TECH 1ST: CPT | Performed by: CLINICAL NEUROPSYCHOLOGIST

## 2025-07-14 PROCEDURE — 96133 NRPSYC TST EVAL PHYS/QHP EA: CPT | Performed by: CLINICAL NEUROPSYCHOLOGIST

## 2025-07-14 PROCEDURE — 96139 PSYCL/NRPSYC TST TECH EA: CPT | Performed by: CLINICAL NEUROPSYCHOLOGIST

## 2025-07-14 NOTE — LETTER
7/14/2025      Juanita Robbins  [address] [facility] [address]      Dear Colleague,    Thank you for referring your patient, Juanita Robbins, to the [facility] NEUROLOGY CLINIC [facility]. Please see a copy of my visit note below.    NEUROPSYCHOLOGY EVALUATION  [facility]      NAME: Juanita Robbins    YOB: 1950   AGE: 75 years old  EDU: 12  DATE OF EVALUATION: 7/14/2025    REASON FOR REFERRAL:  Ms. Robbins is a 75 year-old, left-handed, White female with suspected Parkinson's disease, hypertension, and hyperlipidemia. Due to concerns about cognitive decline, she was referred for this neuropsychological evaluation by neurologist, Kervin Serrano MD, in order to assist with differential diagnosis and care planning.     SUMMARY OF FINDINGS: (please refer to Extended Report below for full details and comprehensive clinical history)  Results of testing indicate that Ms. Robbins is of estimated average-to-high average premorbid intellectual functioning; however, several of Ms. Robbins's performances fall well below that estimate.  Specifically, she exhibits largely mild impairment on measures of visuospatial/constructional abilities (borderline to moderately impaired), executive functioning (largely borderline impaired to low average), attention/concentration (largely borderline impaired), along with variable processing speed (ranging from borderline impaired to average).    All other cognitive test performances are considered intact, including those on measures of confrontation naming, verbal and nonverbal learning/memory, and mental flexibility/set shifting.    Emotionally, the patient endorses mild depressive symptoms and minimal anxiety on self-report questionnaires.  This is fairly consistent with her reports of her mood during the clinical interview, during which she notes feeling more down and apathetic lately.  Her  has also noticed that she is much  more emotional and tearful in recent months.  Suicidal ideation is denied.  She is not currently participating in any mental health treatment.      IMPRESSIONS:  Overall, these results are suggestive of largely mild dysfunction in frontal/subcortical and nondominant parietal regions.  Although her cognitive difficulties are fairly mild on testing, she has been having more difficulty with some complex daily activities.  That said, it is my opinion that a diagnosis of Mild Neurocognitive Disorder best encapsulates her current level of cognitive functioning.  It may be that worsening depression, sleep disturbances, and/or daytime fatigue may be exacerbating her cognitive difficulties at times.  Regard to etiology, the cognitive difficulties described above can be seen in individuals with Parkinsonian syndromes.  As mentioned above, depressed mood, sleep issues, and fatigue might also be contributing to her cognitive difficulties to some degree.  Parkinson's is a neurodegenerative process, and slow, gradual decline in her cognitive functions is likely.  That said, improvement in her mood and sleep quality could elicit a degree of improvement in her cognitive functioning.   At this time, there is no evidence of vascular cognitive impairment or any other common neurodegenerative dementia process (including no evidence of Alzheimer's disease).    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     If not medically contraindicated, Ms. Robbins may benefit from a trial of a medication for cognitive impairment (e.g., donepezil or rivastigmine).     A trial of antidepressant medication might also be warranted, if not medically contraindicated.     The patient may also benefit from establishing care with our psychologist, Yadira  Caprice Maldonado LP ([facility]; [phone]). Dr. Maldonado specializes in working with individual's with Parkinson's as well as their loved ones.  If the patient is interested, she can call the number above to schedule an intake.  Dr. Esposito sees patients both virtually and in person.    Consider a referral to Occupational Therapy for a CPT assessment, which may help clarify her functional capabilities and help determine the level of support needed in her current living situation.  In the meantime, ongoing or occasional oversight with complex daily activities is recommended, particularly with regard to medication management and financial management.  Her current independent living situation remains appropriate from a cognitive standpoint.    Occupational Therapy may also help with implementing compensatory strategies for the patient's visuospatial and other cognitive deficits.     The patient is encouraged to utilize cognitive compensatory strategies in daily life, including utilizing note pads, checklists, to-do lists, a calendar/planner, labeled alarm reminders, a GPS, a pillbox, and maintaining a daily morning and nighttime routine and an organized living/work environment. Performing important/complex tasks or having important conversations should be done in a quiet, distraction-free environment (this includes putting away the cell phone, turning off the TV or music in the background, etc.).     Given her history of sleep disturbance, Ms. Robbins may benefit from evaluating her current sleep hygiene behaviors and if need be, make changes to help facilitate sleep. Relaxation exercises (e.g., listening to soothing music, deep breathing, progressive muscle relaxation) might also help with sleep initiation. If she tends to have difficulty returning to sleep in the night or in falling asleep due to worrying or ruminating, strategies could be discussed with a psychotherapist. If these techniques do  not improve her sleep, she may wish to consult her physician to determine if a medication or a referral to Sleep Medicine is warranted.    If not already done, completion of paperwork for advance directives and assignment of healthcare and financial Power of  should be considered at this time.    It will be increasingly important for Ms. Robbins to have a strong support network in place. The National Parkinson s Foundation (http:www.parkinson.org or 1-776.948.6315) has information about local support groups, respite services, other community resources, as well as a breadth of informational materials.     Ms. Robbins is encouraged to remain physically, socially, and mentally active in order to optimize her brain health.    Repeat neuropsychological testing is recommended in 18-24 months (or as clinically indicated) in order to monitor her cognitive status and update recommendations. The current test data can be used as a baseline to which future comparisons can be made.      Thank you for allowing me to participate in Ms. Robbins's care. Please contact me with any questions regarding the content of this report.        Vikki Gilliland PsyD,   Clinical Neuropsychologist  [facility] [address], [address]  Phone: [phone]  Fax: [phone]          --------------------------------EXTENDED REPORT--------------------------------  The following information was obtained via medical record review and by interview of the patient and her , Ashok.    HISTORY OF PRESENTING PROBLEM:  Per medical records, Ms. Robbins established care with neurologist, Kervin Serrano MD, on 5/10/2024, primarily due to parkinsonism.  During that visit, she reported unsteadiness, slowed gait, and micrographia.  She denied having any significant cognitive concerns at that time, although she scored 22/30 on the MoCA.  No tremor was observed, but gait was notable for decreased left arm swing.    Maggie raised Parkinson's disease as a possibility, and referred her for a DaTscan, which was positive on 6/13/2024.    During a follow-up visit with Dr. Serrano on 7/10/2024, the patient was started on Sinemet.  She also reported slowed processing speed, and she was subsequently referred for this neuropsychological evaluation.      Since then, Ms. Robbins developed double vision.  Dr. Serrano performed additional workup, and suspected that she had myasthenia gravis (unrelated to Parkinson's disease), for which she was started on treatment.  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  He instructed the patient to taper off myasthenia gravis treatments and increased her Sinemet dosage instead.    CURRENT CLINICAL INTERVIEW:  Currently, Ms. Robbins reported experiencing some cognitive issues over the past year, including slowed processing speed and some difficulty with concentration.  She also notices spatial processing issues, citing a recent example of going to hang up a washcloth on a hook, and completely missing the hook.  She denied noticing any significant forgetfulness; however, her  notices that she forgets conversations and recent events more often.  She keeps a detailed calendar, but has been struggling to read her own handwriting.  They denied noticing any distinct episodes of confusion/altered mental status.    Activities of Daily Living (ADLs/IADLs):  Living situation: Lives with  in [facility], where they have resided for 1.5 years  Work: Retired  Driving: No longer driving - stopped couple of years ago because she did not feel comfortable/safe.  Medication Management: Spouse always has set them out for her.  She has an alarm on her phone to help her remember to take them.  Financial Management: Spouse took over due to having increased  "difficulty balancing the checkbook (e.g., she used to balance precisely to the tra, but at one point was off by \"thousands of dollars\").  Cooking/Meal Prep:  Having greater difficulty, was burning things more often and has reduced stamina.  Use of Familiar Devices/Appliances: Household chores/errands are limited by reduced stamina.    MEDICAL HISTORY:  With regard to her suspected Parkinson's disease, the patient reported ongoing balance issues that have been worsening over the past year.  Ashok noted that he typically holds onto her while walking.  She has only fallen once this year due to slipping on ice.  She also reported significantly reduced stamina, hypophonia, micrographia, reduced eye blink, dizziness/lightheadedness with positional change, and pill-rolling tremor.  When asked about hallucinations, the patient reported a couple of instances in which she heard a doorbell.  She denied noticing any visual hallucinations or diminished sense of smell.  She and her  believe that her symptoms are gradually worsening despite use of Sinemet.    In addition to suspected Parkinson's, Ms. Robbins's medical history is additionally significant for hypertension and hypercholesterolemia.  She continues to experience double vision at night, and has an appointment with neuro-ophthalmology at [facility] in October.  She denied having any other issues with vision or hearing.    With regard to sleep, the patient reported that she has difficulty falling asleep \"much of the time.\"  She can usually stay asleep, although she gets up to use the bathroom one or two times per night, and will occasionally have a hard time falling back to sleep.  She never really awakens feeling well rested, and can find herself dozing off on the couch during movies at night.  She does not take any intentional naps.  Ashok reported that she will occasionally sit up and talk in her sleep, but other dream enactment behaviors were denied.  He does " not observe any significant snoring.  Ms. Valente reported that she has RLS in one leg (her right leg) and her  noted that she occasionally jerks in her sleep.      Past Surgical History:   Procedure Laterality Date     BIOPSY  1990    Done in [place]     BREAST SURGERY       COLONOSCOPY  2016    My Final!     EYE SURGERY  2010 & 2913    Lasik & Cataract     HERNIA REPAIR  1950    As a baby     Diagnostic studies:    Brain MRI dated 5/2/2025 revealed:  IMPRESSION:  1.  No evidence for acute or subacute infarction.     2.  No intracranial mass or mass effect. No acute or chronic hemorrhage.     3.  Minimal chronic small vessel ischemic/degenerative changes of aging deep white matter both cerebral hemispheres. Mild to moderate generalized cerebral and cerebellar parenchymal volume loss.    DATscan dated 6/13/2024 revealed:  Impression:  Presynaptic dopaminergic deficit is present.    Current medications include (per medical record):   Current Outpatient Medications:      atorvastatin (LIPITOR) 10 MG tablet, Take 1 tablet (10 mg) by mouth daily., Disp: 90 tablet, Rfl: 0     carbidopa-levodopa (SINEMET)  MG tablet, Take 1 tablet by mouth., Disp: , Rfl:      losartan (COZAAR) 25 MG tablet, Take 1 tablet (25 mg) by mouth daily., Disp: 90 tablet, Rfl: 3     multivitamin (CENTRUM SILVER) tablet, Take 1 tablet by mouth daily, Disp: , Rfl:      pyRIDostigmine (MESTINON) 60 MG tablet, Take 1.5 -2 pills at 4-5 PM as needed and tolerated. (Patient taking differently: Take 1.5 pills for 3 days), Disp: 180 tablet, Rfl: 1     RISEdronate (ACTONEL) 150 MG tablet, Take 1 tablet (150 mg) by mouth every 30 days., Disp: 3 tablet, Rfl: 3    RELEVANT FAMILY MEDICAL HISTORY:   There is no known family neurologic history. Other family medical history is positive for the following:  Family History   Problem Relation Age of Onset     Hypertension Mother      Osteoporosis Mother      Coronary Artery Disease Father       Cerebrovascular Disease Maternal Grandmother      PSYCHIATRIC HISTORY:  With regard to her psychiatric history, Ms. Robbins has no significant mental health history. However, in recent months, she has experienced increased depression, apathy, and anxiety.  She also reported ongoing stressors related to losing her ability to engage in her hobbies (she can no longer run long distances, knit, sew, craft, cut hair, or ride her bike), not having friends yet in [place] (they have just moved here about a year and a half ago from out of state), and worrying about Ashok someday being unable to drive.  She denied any suicidal ideation.  Her PCP recently discussed an antidepressant medication, but the patient was reluctant to start one at the time.  She is increasingly more open to trying a medication.  She is not currently interested in therapy.    With regard to substance use, Ms. Robbins denied any current or historical substance abuse. Current substance use was denied.    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 Robbins arrived on time and accompanied by her  to today's appointment. She was appropriately dressed and groomed. She appeared alert and engaged. Eye blink was reduced. No tremor was observed. She did not exhibit any clear vision or hearing difficulties. Conversational speech was of variable volume  but otherwise of relatively normal rate and prosody. No significant word-finding pauses or paraphasias were noted. Her thought process appeared linear and goal-directed. No hallucinations or delusions were apparent. Judgment and insight appeared largely intact. Her mood was dysthymic and her affect was appropriately reactive. She became mildly tearful a few times while discussing her mood. Rapport was easily established and eye contact was appropriate.     During the testing session, Ms. Robbins was alert throughout. She was pleasant and cooperative throughout the evaluation. She understood test instructions without difficulty.  She became tearful at times when she perceived that she was doing poorly.  No frontal signs were observed behaviorally. Ms. Robbins appeared adequately motivated and engaged easily during testing. Her score on an embedded measure of performance validity was in the valid range. Overall, the following results are considered a reasonably valid estimation of her current cognitive abilities.    TESTS ADMINISTERED:   Wechsler Memory Scale-III (WMS-III) select subtests, Wechsler Adult Intelligence Scale-IV (WAIS-IV) select subtests, Wide Range Achievement Test-5 (WRAT-5) select subtests, Wechsler Memory Scale-IV (WMS-IV) select subtests, Mathew Verbal Learning Test-R (HVLT-R), Trailmaking Test (Trails A & B), Stroop Color and Word Test, Controlled Oral Word Association Test (COWAT) and Category Fluency, [place] Naming Test-2 (BNT-2), Layo-Osterrieth Complex Figure Test (RCFT) and Clock Drawing Test, Sanchez Judgement of Line Orientation (TABBY), Generalized Anxiety Disorder-7 Assessment (RICK-7), Geriatric Depression Scale (GDS), and Independent Living Scales (ILS) - Health & Safety subtest.    MOANS norms were used for BNT, Trail Making Test A & B, COWAT & Category Fluency, Stroop, Layo-O Copy     DESCRIPTIVE PERFORMANCE KEY:    Labels for tests with Normal Distributions  Score Label Standard Score %ile Rank    Exceptionally high score  > 130 > 98   Above average score 120-129 91-97   High average score 110-119 75-90   Average score  25-74   Low average score 80-89 9-24   Below average score 70-79 2-8   Exceptionally low score < 70 < 2     Labels for tests with Non-Normal Distributions  Score Label %ile Rank   Within normal expectations/ limits score (WNL) > 24   Low average score 9-24   Below average score 2-8   Exceptionally low score < 2     The following test results utilize score labels as adapted from Blayne Rodriguez, Tutu Lagunas, Alva Mclaughlin, ARMOND Ken, Bernadette Wright, Gomez Bray & Conference Participatnts (2020): American Academy of Clinical Neuropsychology consensus conference statement on uniform labeling of performance test scores, The Clinical Neuropsychologist, DOI: 10.1080/[email or web address]. All scores contain some measure of error; scores are reported here as they are obtained by the individual (without reference to the range of error). These are meant as labels and not interpretation of performance. While other relevant comments regarding task performance are provided below, please see the Summary, Impressions, and Diagnosis sections of this report for interpretation of the scores and the cognitive profile as a whole, including what does and does not constitute impairment for this particular individual.    OPTIMAL PREMORBID INTELLECT:  Optimal premorbid intellectual abilities were estimated as falling in the average-to-high average range based on Ms. Robbins's educational and occupational histories and performance on tasks least likely to be affected by acquired brain dysfunction.    SUMMARY OF TEST RESULTS:  ORIENTATION. Performance on a mental status exam, assessing orientation to personal and current information, resulted in a within normal limits score. She was oriented to personal information, place, time, and date and was able to correctly  name the current and previous presidents.    ATTENTION/WORKING MEMORY. The patient's score on a measure sensitive to sustained auditory-verbal attention and concentration (WAIS-IV Digit Span) was classified as low average, as she was able to recite up to 5 digits forward (an average score), up to 3 digits backward (a low average score), and up to 3 digits in sequence (a low average score).      PROCESSING SPEED. Speeded digit-symbol coding was average (WAIS-IV Coding). On a test of complex concentration that requires speeded numeric sequencing (Trails A), the patient's score was average for completion time and without error. On the Stroop test, speeded color naming was low average, and speeded word reading was low average.     LANGUAGE PROCESSING. Language comprehension appeared intact. Confrontation naming was measured as an exceptionally high score (59/60; BNT-2). The patient's performance on a test of phonemic fluency resulted in a low average score (COWAT), and her semantic fluency was low average (Category Fluency). Her writing sample was micrographic and increasingly difficult to read (so much so that the patient was unable to read her last sentence back).      VISUOSPATIAL/CONSTRUCTIONAL SKILLS. Visuoconstruction with three-dimensional blocks was low average (WAIS-IV Block Design). Spatial judgment, as measured by Judgment of Line Orientation, was classified as exceptionally low. Copy of a complex geometric figure (RCFT Copy) was below expectation, and the patient ultimately discontinued the task out of frustration.  Of what she did not draw, her approach was piecemeal, the copy was generally imprecise, and detailed elements were slightly misplaced.  In contrast, copy of simple geometric figures was within normal limits (WMS-IV Visual Reproduction Copy). On a Clock Drawing Task, the patient was able to draw a large, well-formed [place] with equally spaced and correctly placed numbers. Her clock hands converged  nicely in the center of the clock face and were well differentiated by length.      LEARNING/MEMORY. On the WMS-IV Logical Memory subtest, immediate memory for two paragraph-length stories resulted in a high average score. After a 20-minute delay, the patient's score on the delayed recall trial was average with a 78% retention rate. On the recognition trial, the patient's score was classified as within normal limits.    On a 12-item verbal list-learning task (HVLT-R), the patient recited up to 9 words of the word list by the 3rd and final learning trial (raw scores over trials = 6, 7, 9). Total learning acquisition was classified as an average score. After a 20-minute delay, the patient recalled 8 items, reflecting an average score with an 89% retention rate. Her recognition discriminability score was within normal limits, as she correctly recognized 12/12 items and made only 1 false-positive error.     On a visual memory measure (WMS-IV Visual Reproduction), immediate recall of simple geometric figures was average, and delayed recall of the figures was average (with a 44% retention rate). Delayed recognition of the figures was within normal limits.     EXECUTIVE FUNCTIONS. On a test of inhibition and cognitive flexibility (Stroop), the patient's score was low average for completion time and made 2 errors. On a test that requires speeded alpha-numeric sequencing/cognitive set-shifting (Trails B), performance was average and with 1 error. Phonemic fluency was low average (COWAT). On the Clock Drawing Test, the patient was unable to accurately represent analog time.     INDEPENDENT LIVING SKILLS. The patient was administered the Health & Safety subtest of the Independent Living Skills measure, which assesses the patient's judgment and problem-solving abilities as it pertains to various hypothetical health and safety dilemmas. Compared to a healthy, community-dwelling geriatric population, the patient's score was below  "average.  Although all of her responses were at least partially correct, she sometimes omitted a step or a mid-level response (e.g., when asked what she would do if someone knocked on her door at 10 PM and she was not expecting anyone, she stated that she would simply \"call 911\").    MOOD. On the GDS a self report measure of depressive symptomatology, she obtained a score of 16, placing her in the range of mild depressive symptoms. On the RICK-7, a self-report measure of anxiety, she obtained a score of 0,  placing her in the range of minimal anxiety.    ____________________________________________________________________________________    SERVICES PROVIDED & TIME:  On-site Office Visit Details   Verbal consent for neuropsychological testing was received following the provision of information about the nature and purpose of the evaluation, and the opportunity to ask questions. Verbal permission to route a copy of the final report to her primary care provider was also obtained.  A clinical interview/neurobehavioral status examination was conducted with the patient and documented. I thoroughly reviewed the medical record, selected the neuropsychological test battery, provided supervision to the trained examiner/technician, interpreted/integrated patient data and test results, and engaged in clinical decision making, treatment planning, and report writing/preparation. A trained examiner/technician administered and scored the neuropsychological tests (2+ tests).  Please see below for a breakdown of time spent and the associated codes billed for these services.  Services   Time Spent  CPT Codes   Neurobehavioral Status Exam:  (e.g., clinical interview and neurobehavioral status exam, interpretation, report)   100 minutes   1 x 96116  1 x 96121   Neuropsychological Evaluation Services:   (e.g., integration, interpretation, treatment planning, clinical decision making)   156 minutes   1 x 96132  2 x 96133 "   Neuropsychological Testing by Trained Examiner/Technician:  (e.g., test administration, scoring, 2+ tests administered)   115 minutes   1 x 96138  3 x 96139   For diagnostic and coding purposes, Ms. Robbins has a history of suspected Parkinson's disease, hypertension, and hyperlipidemia. 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        The patient was seen for a neuropsychological evaluation for the purposes of diagnostic clarification and treatment planning. 90 minutes of face-to-face testing were provided by this writer. An additional 25 minutes were spent scoring and compiling test results. The patient was cooperative with testing. No concerns were brought to my attention. Please see Dr. Gilliland's report for a detailed description of the charges and interpretation and integration of the findings.     Again, thank you for allowing me to participate in the care of your patient.        Sincerely,        Vikki Gilliland Psy.D, ESTELA    Electronically signed

## 2025-07-14 NOTE — PROGRESS NOTES
The patient was seen for a neuropsychological evaluation for the purposes of diagnostic clarification and treatment planning. 90 minutes of face-to-face testing were provided by this writer. An additional 25 minutes were spent scoring and compiling test results. The patient was cooperative with testing. No concerns were brought to my attention. Please see Dr. Gilliland's report for a detailed description of the charges and interpretation and integration of the findings.

## 2025-07-14 NOTE — PROGRESS NOTES
NEUROPSYCHOLOGY EVALUATION  [facility]      NAME: Juanita Robbins    YOB: 1950   AGE: 75 years old  EDU: 12  DATE OF EVALUATION: 7/14/2025    REASON FOR REFERRAL:  Ms. Robbins is a 75 year-old, left-handed, White female with suspected Parkinson's disease, hypertension, and hyperlipidemia. Due to concerns about cognitive decline, she was referred for this neuropsychological evaluation by neurologist, Kervin Serrano MD, in order to assist with differential diagnosis and care planning.     SUMMARY OF FINDINGS: (please refer to Extended Report below for full details and comprehensive clinical history)  Results of testing indicate that Ms. Robbins is of estimated average-to-high average premorbid intellectual functioning; however, several of Ms. Robbins's performances fall well below that estimate.  Specifically, she exhibits largely mild impairment on measures of visuospatial/constructional abilities (borderline to moderately impaired), executive functioning (largely borderline impaired to low average), attention/concentration (largely borderline impaired), along with variable processing speed (ranging from borderline impaired to average).    All other cognitive test performances are considered intact, including those on measures of confrontation naming, verbal and nonverbal learning/memory, and mental flexibility/set shifting.    Emotionally, the patient endorses mild depressive symptoms and minimal anxiety on self-report questionnaires.  This is fairly consistent with her reports of her mood during the clinical interview, during which she notes feeling more down and apathetic lately.  Her  has also noticed that she is much more emotional and tearful in recent months.  Suicidal ideation is denied.  She is not currently participating in any mental health treatment.      IMPRESSIONS:  Overall, these results are suggestive of largely mild dysfunction in frontal/subcortical and  nondominant parietal regions.  Although her cognitive difficulties are fairly mild on testing, she has been having more difficulty with some complex daily activities.  That said, it is my opinion that a diagnosis of Mild Neurocognitive Disorder best encapsulates her current level of cognitive functioning.  It may be that worsening depression, sleep disturbances, and/or daytime fatigue may be exacerbating her cognitive difficulties at times.  Regard to etiology, the cognitive difficulties described above can be seen in individuals with Parkinsonian syndromes.  As mentioned above, depressed mood, sleep issues, and fatigue might also be contributing to her cognitive difficulties to some degree.  Parkinson's is a neurodegenerative process, and slow, gradual decline in her cognitive functions is likely.  That said, improvement in her mood and sleep quality could elicit a degree of improvement in her cognitive functioning.   At this time, there is no evidence of vascular cognitive impairment or any other common neurodegenerative dementia process (including no evidence of Alzheimer's disease).    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     If not medically contraindicated, Ms. Robbins may benefit from a trial of a medication for cognitive impairment (e.g., donepezil or rivastigmine).     A trial of antidepressant medication might also be warranted, if not medically contraindicated.     The patient may also benefit from establishing care with our psychologist, Yadira Maldonado PsyD, LP ([facility]; [phone]). Dr. Maldonado specializes in working with individual's with Parkinson's as well as their loved ones.  If the patient is interested, she can call the number above to schedule an intake.    [facility]ate sees patients both virtually and in person.    Consider a referral to Occupational Therapy for a CPT assessment, which may help clarify her functional capabilities and help determine the level of support needed in her current living situation.  In the meantime, ongoing or occasional oversight with complex daily activities is recommended, particularly with regard to medication management and financial management.  Her current independent living situation remains appropriate from a cognitive standpoint.    Occupational Therapy may also help with implementing compensatory strategies for the patient's visuospatial and other cognitive deficits.     The patient is encouraged to utilize cognitive compensatory strategies in daily life, including utilizing note pads, checklists, to-do lists, a calendar/planner, labeled alarm reminders, a GPS, a pillbox, and maintaining a daily morning and nighttime routine and an organized living/work environment. Performing important/complex tasks or having important conversations should be done in a quiet, distraction-free environment (this includes putting away the cell phone, turning off the TV or music in the background, etc.).     Given her history of sleep disturbance, Ms. Robbins may benefit from evaluating her current sleep hygiene behaviors and if need be, make changes to help facilitate sleep. Relaxation exercises (e.g., listening to soothing music, deep breathing, progressive muscle relaxation) might also help with sleep initiation. If she tends to have difficulty returning to sleep in the night or in falling asleep due to worrying or ruminating, strategies could be discussed with a psychotherapist. If these techniques do not improve her sleep, she may wish to consult her physician to determine if a medication or a referral to Sleep Medicine is warranted.    If not already done, completion of paperwork for advance directives and assignment of healthcare and financial Power of   should be considered at this time.    It will be increasingly important for Ms. Robbins to have a strong support network in place. The National Parkinson s Foundation (http:www.parkinson.org or 1-715.229.6627) has information about local support groups, respite services, other community resources, as well as a breadth of informational materials.     Ms. Robbins is encouraged to remain physically, socially, and mentally active in order to optimize her brain health.    Repeat neuropsychological testing is recommended in 18-24 months (or as clinically indicated) in order to monitor her cognitive status and update recommendations. The current test data can be used as a baseline to which future comparisons can be made.      Thank you for allowing me to participate in Ms. Robbins's care. Please contact me with any questions regarding the content of this report.        Vikki Gilliland PsyD,   Clinical Neuropsychologist  [facility] [address], [address]  Phone: [phone]  Fax: [phone]          --------------------------------EXTENDED REPORT--------------------------------  The following information was obtained via medical record review and by interview of the patient and her , Ashok.    HISTORY OF PRESENTING PROBLEM:  Per medical records, Ms. Robbins established care with neurologist, Kervin Serrano MD, on 5/10/2024, primarily due to parkinsonism.  During that visit, she reported unsteadiness, slowed gait, and micrographia.  She denied having any significant cognitive concerns at that time, although she scored 22/30 on the MoCA.  No tremor was observed, but gait was notable for decreased left arm swing.  Dr. Serrano raised Parkinson's disease as a possibility, and referred her for a DaTscan, which was positive on 6/13/2024.    During a follow-up visit with Dr. Serrano on 7/10/2024, the patient was started on Sinemet.  She also reported slowed processing speed,  "and she was subsequently referred for this neuropsychological evaluation.      Since then, Ms. Robbins developed double vision.  Dr. Serrano performed additional workup, and suspected that she had myasthenia gravis (unrelated to Parkinson's disease), for which she was started on treatment.  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  He instructed the patient to taper off myasthenia gravis treatments and increased her Sinemet dosage instead.    CURRENT CLINICAL INTERVIEW:  Currently, Ms. Robbins reported experiencing some cognitive issues over the past year, including slowed processing speed and some difficulty with concentration.  She also notices spatial processing issues, citing a recent example of going to hang up a washcloth on a hook, and completely missing the hook.  She denied noticing any significant forgetfulness; however, her  notices that she forgets conversations and recent events more often.  She keeps a detailed calendar, but has been struggling to read her own handwriting.  They denied noticing any distinct episodes of confusion/altered mental status.    Activities of Daily Living (ADLs/IADLs):  Living situation: Lives with  in [facility], where they have resided for 1.5 years  Work: Retired  Driving: No longer driving - stopped couple of years ago because she did not feel comfortable/safe.  Medication Management: Spouse always has set them out for her.  She has an alarm on her phone to help her remember to take them.  Financial Management: Spouse took over due to having increased difficulty balancing the checkbook (e.g., she used to balance precisely to the tra, but at one point was off by \"thousands of dollars\").  Cooking/Meal Prep:  Having greater difficulty, was burning things more often and has reduced stamina.  Use of Familiar " "Devices/Appliances: Household chores/errands are limited by reduced stamina.    MEDICAL HISTORY:  With regard to her suspected Parkinson's disease, the patient reported ongoing balance issues that have been worsening over the past year.  Ashok noted that he typically holds onto her while walking.  She has only fallen once this year due to slipping on ice.  She also reported significantly reduced stamina, hypophonia, micrographia, reduced eye blink, dizziness/lightheadedness with positional change, and pill-rolling tremor.  When asked about hallucinations, the patient reported a couple of instances in which she heard a doorbell.  She denied noticing any visual hallucinations or diminished sense of smell.  She and her  believe that her symptoms are gradually worsening despite use of Sinemet.    In addition to suspected Parkinson's, Ms. Robbins's medical history is additionally significant for hypertension and hypercholesterolemia.  She continues to experience double vision at night, and has an appointment with neuro-ophthalmology at [facility] in October.  She denied having any other issues with vision or hearing.    With regard to sleep, the patient reported that she has difficulty falling asleep \"much of the time.\"  She can usually stay asleep, although she gets up to use the bathroom one or two times per night, and will occasionally have a hard time falling back to sleep.  She never really awakens feeling well rested, and can find herself dozing off on the couch during movies at night.  She does not take any intentional naps.  Ashok reported that she will occasionally sit up and talk in her sleep, but other dream enactment behaviors were denied.  He does not observe any significant snoring.  Ms. Valente reported that she has RLS in one leg (her right leg) and her  noted that she occasionally jerks in her sleep.      Past Surgical History:   Procedure Laterality Date    BIOPSY  1990    Done in [place], " [place]    BREAST SURGERY      COLONOSCOPY  2016    My Final!    EYE SURGERY  2010 & 2913    Lasik & Cataract    HERNIA REPAIR  1950    As a baby     Diagnostic studies:    Brain MRI dated 5/2/2025 revealed:  IMPRESSION:  1.  No evidence for acute or subacute infarction.     2.  No intracranial mass or mass effect. No acute or chronic hemorrhage.     3.  Minimal chronic small vessel ischemic/degenerative changes of aging deep white matter both cerebral hemispheres. Mild to moderate generalized cerebral and cerebellar parenchymal volume loss.    DATscan dated 6/13/2024 revealed:  Impression:  Presynaptic dopaminergic deficit is present.    Current medications include (per medical record):   Current Outpatient Medications:     atorvastatin (LIPITOR) 10 MG tablet, Take 1 tablet (10 mg) by mouth daily., Disp: 90 tablet, Rfl: 0    carbidopa-levodopa (SINEMET)  MG tablet, Take 1 tablet by mouth., Disp: , Rfl:     losartan (COZAAR) 25 MG tablet, Take 1 tablet (25 mg) by mouth daily., Disp: 90 tablet, Rfl: 3    multivitamin (CENTRUM SILVER) tablet, Take 1 tablet by mouth daily, Disp: , Rfl:     pyRIDostigmine (MESTINON) 60 MG tablet, Take 1.5 -2 pills at 4-5 PM as needed and tolerated. (Patient taking differently: Take 1.5 pills for 3 days), Disp: 180 tablet, Rfl: 1    RISEdronate (ACTONEL) 150 MG tablet, Take 1 tablet (150 mg) by mouth every 30 days., Disp: 3 tablet, Rfl: 3    RELEVANT FAMILY MEDICAL HISTORY:   There is no known family neurologic history. Other family medical history is positive for the following:  Family History   Problem Relation Age of Onset    Hypertension Mother     Osteoporosis Mother     Coronary Artery Disease Father     Cerebrovascular Disease Maternal Grandmother      PSYCHIATRIC HISTORY:  With regard to her psychiatric history, Ms. Robbins has no significant mental health history. However, in recent months, she has experienced increased depression, apathy, and anxiety.  She also reported ongoing  stressors related to losing her ability to engage in her hobbies (she can no longer run long distances, knit, sew, craft, cut hair, or ride her bike), not having friends yet in [place] (they have just moved here about a year and a half ago from out of state), and worrying about Ashok someday being unable to drive.  She denied any suicidal ideation.  Her PCP recently discussed an antidepressant medication, but the patient was reluctant to start one at the time.  She is increasingly more open to trying a medication.  She is not currently interested in therapy.    With regard to substance use, Ms. Robbins denied any current or historical substance abuse. Current substance use was denied.    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 Robbins arrived on time and accompanied by her  to today's appointment. She was appropriately dressed and groomed. She appeared alert and engaged. Eye blink was reduced. No tremor was observed. She did not exhibit any clear vision or hearing difficulties. Conversational speech was of variable volume but otherwise of relatively normal rate and prosody. No significant word-finding pauses or paraphasias were noted. Her thought process appeared linear and goal-directed. No hallucinations or delusions were apparent. Judgment and insight appeared largely intact. Her mood was  dysthymic and her affect was appropriately reactive. She became mildly tearful a few times while discussing her mood. Rapport was easily established and eye contact was appropriate.     During the testing session, Ms. Robbins was alert throughout. She was pleasant and cooperative throughout the evaluation. She understood test instructions without difficulty.  She became tearful at times when she perceived that she was doing poorly.  No frontal signs were observed behaviorally. Ms. Robbins appeared adequately motivated and engaged easily during testing. Her score on an embedded measure of performance validity was in the valid range. Overall, the following results are considered a reasonably valid estimation of her current cognitive abilities.    TESTS ADMINISTERED:   Wechsler Memory Scale-III (WMS-III) select subtests, Wechsler Adult Intelligence Scale-IV (WAIS-IV) select subtests, Wide Range Achievement Test-5 (WRAT-5) select subtests, Wechsler Memory Scale-IV (WMS-IV) select subtests, Mathew Verbal Learning Test-R (HVLT-R), Trailmaking Test (Trails A & B), Stroop Color and Word Test, Controlled Oral Word Association Test (COWAT) and Category Fluency, Joseph Naming Test-2 (BNT-2), Layo-Osterrieth Complex Figure Test (RCFT) and Clock Drawing Test, Sanchez Judgement of Line Orientation (TABBY), Generalized Anxiety Disorder-7 Assessment (RICK-7), Geriatric Depression Scale (GDS), and Independent Living Scales (ILS) - Health & Safety subtest.    MOANS norms were used for BNT, Trail Making Test A & B, COWAT & Category Fluency, Stroop, Layo-O Copy     DESCRIPTIVE PERFORMANCE KEY:    Labels for tests with Normal Distributions  Score Label Standard Score %ile Rank   Exceptionally high score  > 130 > 98   Above average score 120-129 91-97   High average score 110-119 75-90   Average score  25-74   Low average score 80-89 9-24   Below average score 70-79 2-8   Exceptionally low score < 70 < 2     Labels for tests with Non-Normal  Distributions  Score Label %ile Rank   Within normal expectations/ limits score (WNL) > 24   Low average score 9-24   Below average score 2-8   Exceptionally low score < 2     The following test results utilize score labels as adapted from Blayne Rodriguez, Tutu Lagunas, Alva Mclaughlin, ARMOND Ken, Bernadette Wright, Gomez Bray & Conference Participatnts (2020): American Academy of Clinical Neuropsychology consensus conference statement on uniform labeling of performance test scores, The Clinical Neuropsychologist, DOI: 10.1080/[email or web address]. All scores contain some measure of error; scores are reported here as they are obtained by the individual (without reference to the range of error). These are meant as labels and not interpretation of performance. While other relevant comments regarding task performance are provided below, please see the Summary, Impressions, and Diagnosis sections of this report for interpretation of the scores and the cognitive profile as a whole, including what does and does not constitute impairment for this particular individual.    OPTIMAL PREMORBID INTELLECT:  Optimal premorbid intellectual abilities were estimated as falling in the average-to-high average range based on Ms. Robbins's educational and occupational histories and performance on tasks least likely to be affected by acquired brain dysfunction.    SUMMARY OF TEST RESULTS:  ORIENTATION. Performance on a mental status exam, assessing orientation to personal and current information, resulted in a within normal limits score. She was oriented to personal information, place, time, and date and was able to correctly name the current and previous presidents.    ATTENTION/WORKING MEMORY. The patient's score on a measure sensitive to sustained auditory-verbal attention and concentration (WAIS-IV Digit Span) was classified as low average, as she was able to recite up to 5 digits forward  (an average score), up to 3 digits backward (a low average score), and up to 3 digits in sequence (a low average score).      PROCESSING SPEED. Speeded digit-symbol coding was average (WAIS-IV Coding). On a test of complex concentration that requires speeded numeric sequencing (Trails A), the patient's score was average for completion time and without error. On the Stroop test, speeded color naming was low average, and speeded word reading was low average.     LANGUAGE PROCESSING. Language comprehension appeared intact. Confrontation naming was measured as an exceptionally high score (59/60; BNT-2). The patient's performance on a test of phonemic fluency resulted in a low average score (COWAT), and her semantic fluency was low average (Category Fluency). Her writing sample was micrographic and increasingly difficult to read (so much so that the patient was unable to read her last sentence back).      VISUOSPATIAL/CONSTRUCTIONAL SKILLS. Visuoconstruction with three-dimensional blocks was low average (WAIS-IV Block Design). Spatial judgment, as measured by Judgment of Line Orientation, was classified as exceptionally low. Copy of a complex geometric figure (RCFT Copy) was below expectation, and the patient ultimately discontinued the task out of frustration.  Of what she did not draw, her approach was piecemeal, the copy was generally imprecise, and detailed elements were slightly misplaced.  In contrast, copy of simple geometric figures was within normal limits (WMS-IV Visual Reproduction Copy). On a Clock Drawing Task, the patient was able to draw a large, well-formed [place] with equally spaced and correctly placed numbers. Her clock hands converged nicely in the center of the clock face and were well differentiated by length.      LEARNING/MEMORY. On the WMS-IV Logical Memory subtest, immediate memory for two paragraph-length stories resulted in a high average score. After a 20-minute delay, the patient's score on  the delayed recall trial was average with a 78% retention rate. On the recognition trial, the patient's score was classified as within normal limits.    On a 12-item verbal list-learning task (HVLT-R), the patient recited up to 9 words of the word list by the 3rd and final learning trial (raw scores over trials = 6, 7, 9). Total learning acquisition was classified as an average score. After a 20-minute delay, the patient recalled 8 items, reflecting an average score with an 89% retention rate. Her recognition discriminability score was within normal limits, as she correctly recognized 12/12 items and made only 1 false-positive error.     On a visual memory measure (WMS-IV Visual Reproduction), immediate recall of simple geometric figures was average, and delayed recall of the figures was average (with a 44% retention rate). Delayed recognition of the figures was within normal limits.     EXECUTIVE FUNCTIONS. On a test of inhibition and cognitive flexibility (Stroop), the patient's score was low average for completion time and made 2 errors. On a test that requires speeded alpha-numeric sequencing/cognitive set-shifting (Trails B), performance was average and with 1 error. Phonemic fluency was low average (COWAT). On the Clock Drawing Test, the patient was unable to accurately represent analog time.     INDEPENDENT LIVING SKILLS. The patient was administered the Health & Safety subtest of the Independent Living Skills measure, which assesses the patient's judgment and problem-solving abilities as it pertains to various hypothetical health and safety dilemmas. Compared to a healthy, community-dwelling geriatric population, the patient's score was below average.  Although all of her responses were at least partially correct, she sometimes omitted a step or a mid-level response (e.g., when asked what she would do if someone knocked on her door at 10 PM and she was not expecting anyone, she stated that she would simply  "\"call 911\").    MOOD. On the GDS a self report measure of depressive symptomatology, she obtained a score of 16, placing her in the range of mild depressive symptoms. On the RICK-7, a self-report measure of anxiety, she obtained a score of 0,  placing her in the range of minimal anxiety.    ____________________________________________________________________________________    SERVICES PROVIDED & TIME:  On-site Office Visit Details   Verbal consent for neuropsychological testing was received following the provision of information about the nature and purpose of the evaluation, and the opportunity to ask questions. Verbal permission to route a copy of the final report to her primary care provider was also obtained.  A clinical interview/neurobehavioral status examination was conducted with the patient and documented. I thoroughly reviewed the medical record, selected the neuropsychological test battery, provided supervision to the trained examiner/technician, interpreted/integrated patient data and test results, and engaged in clinical decision making, treatment planning, and report writing/preparation. A trained examiner/technician administered and scored the neuropsychological tests (2+ tests).  Please see below for a breakdown of time spent and the associated codes billed for these services.  Services   Time Spent  CPT Codes   Neurobehavioral Status Exam:  (e.g., clinical interview and neurobehavioral status exam, interpretation, report)   100 minutes   1 x 96116  1 x 96121   Neuropsychological Evaluation Services:   (e.g., integration, interpretation, treatment planning, clinical decision making)   156 minutes   1 x 96132  2 x 96133   Neuropsychological Testing by Trained Examiner/Technician:  (e.g., test administration, scoring, 2+ tests administered)   115 minutes   1 x 96138  3 x 96139   For diagnostic and coding purposes, Ms. Robbins has a history of suspected Parkinson's disease, hypertension, and " hyperlipidemia. 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

## 2025-07-17 ENCOUNTER — MYC MEDICAL ADVICE (OUTPATIENT)
Dept: NEUROLOGY | Facility: CLINIC | Age: 75
End: 2025-07-17
Payer: MEDICARE

## 2025-07-17 NOTE — TELEPHONE ENCOUNTER
Patient and her  came into the clinic today to ask about the medication.  There is multiple conflicting messages from different providers regarding the medication. Patient recently saw Dr. Lauren at the [facility] on 7/7/25, I suggested that they contact that providers office for the medication renewal as he was the most recent person to see the patient and had recommendations on the medication. They agreed with the plan and will contact his office.   Neeta Baker MA,[facility],10:13 AM

## 2025-07-21 RX ORDER — FLUOXETINE 10 MG/1
10 CAPSULE ORAL DAILY
Qty: 90 CAPSULE | Refills: 0 | Status: SHIPPED | OUTPATIENT
Start: 2025-07-21

## 2025-07-24 ASSESSMENT — ACTIVITIES OF DAILY LIVING (ADL)
PUTTING_ON_AN_UNDERSHIRT_OR_A_PULLOVER_SWEATER: 2
REACHING_FOR_SOMETHING_ON_A_HIGH_SHELF: 2
AT_ITS_WORST?: 2
PUTTING_ON_A_SHIRT_THAT_BUTTONS_DOWN_THE_FRONT: 2
WASHING_YOUR_HAIR?: 0
TOUCHING_THE_BACK_OF_YOUR_NECK: 3
REMOVING_SOMETHING_FROM_YOUR_BACK_POCKET: 3
WASHING_YOUR_BACK: 0
PUSHING_WITH_THE_INVOLVED_ARM: 2
WHEN_LYING_ON_THE_INVOLVED_SIDE: 2
PUTTING_ON_YOUR_PANTS: 2
PLACING_AN_OBJECT_ON_A_HIGH_SHELF: 3
CARRYING_A_HEAVY_OBJECT_OF_10_POUNDS: 3
PLEASE_INDICATE_YOR_PRIMARY_REASON_FOR_REFERRAL_TO_THERAPY:: SHOULDER

## 2025-07-29 ENCOUNTER — THERAPY VISIT (OUTPATIENT)
Dept: PHYSICAL THERAPY | Facility: REHABILITATION | Age: 75
End: 2025-07-29
Attending: STUDENT IN AN ORGANIZED HEALTH CARE EDUCATION/TRAINING PROGRAM
Payer: MEDICARE

## 2025-07-29 DIAGNOSIS — G89.29 CHRONIC LEFT SHOULDER PAIN: Primary | Chronic | ICD-10-CM

## 2025-07-29 DIAGNOSIS — M25.512 CHRONIC LEFT SHOULDER PAIN: Primary | Chronic | ICD-10-CM

## 2025-07-29 PROCEDURE — 97161 PT EVAL LOW COMPLEX 20 MIN: CPT | Mod: GP

## 2025-07-29 PROCEDURE — 97110 THERAPEUTIC EXERCISES: CPT | Mod: GP

## 2025-07-29 NOTE — PROGRESS NOTES
PHYSICAL THERAPY EVALUATION  Type of Visit: Evaluation      Pt  at appointment unable to do abuse screen  Fall Risk Screen:  Have you fallen 2 or more times in the past year?: Yes  Have you fallen and had an injury in the past year?: Yes      Subjective   Pt is a 75 year old female referred to PT for left posterior shoulder pain that reaches up to a 2-3/10 pain and goes away quickly afterwards. Pt has trouble with clipping bra strap and putting on clothing due to this pain.      Presenting condition or subjective complaint: sHOULDER WAS jammed in April as a result of fall.  I HAVE PD.  Date of onset: (P) 25    Relevant medical history: Depression; High blood pressure; Osteoporosis; Progressive neurological deficits   Dates & types of surgery: -hernia repair, eyes in     Prior diagnostic imaging/testing results: X-ray     Prior therapy history for the same diagnosis, illness or injury: No      Living Environment  Social support: With a significant other or spouse   Type of home: [facility]; 1 level   Stairs to enter the home: No       Ramp: No   Stairs inside the home: No       Help at home: Self Cares (home health aide/personal care attendant, family, etc); Medication and/or finances; Home and Yard maintenance tasks; Assist for driving and community activities  Equipment owned:       Employment: No    Hobbies/Interests: none now!    Patient goals for therapy: eVERYTHING I COULD do beore my fall.    Pain assessment: Pain present  Location: pt stated that pt has pain in shoulder while putting /Ratin-3/10 in the back of her shoulder     Objective   SHOULDER EVALUATION  PAIN: Pain Level with Use: 3/10  INTEGUMENTARY (edema, incisions): WNL  POSTURE: Sitting Posture: Rounded shoulders, Forward head, Lordosis increased  BALANCE/PROPRIOCEPTION: pt stated that her balance hasn't been the best PT gave some balance exercises  ROM:   (Degrees) Left AROM Left PROM Right AROM  Right PROM   Shoulder Flexion  130 135 150 155   Shoulder Extension 55 55 60 60   Shoulder Abduction 90 95 110 115   Shoulder Internal Rotation 90 90 90 90   Shoulder External Rotation 45 60 90 90   Elbow Extension WNL      Elbow Flexion WNL      Pain: pain with external rotation and resistance for MMT on the LUE  End feel:     STRENGTH:   Pain: - none + mild ++ moderate +++ severe  Strength Scale: 0-5/5 Left Right   Shoulder Flexion 4- 5   Shoulder Extension 4+ 5   Shoulder Abduction 4- 5   Shoulder Adduction 5 5   Shoulder Internal Rotation 4+ 5   Shoulder External Rotation 4- 5   Elbow Flexion 5 5   Elbow Extension 5 5   Mid Trap 3+ 5-   Lower Trap 3+ 5   Rhomboid 3+ 5   Serratus Anterior 4+ 5       Assessment & Plan   CLINICAL IMPRESSIONS  Medical Diagnosis: Chronic left shoulder pain (M25.512, G89.29)    Treatment Diagnosis: left shoulder weakness   Impression/Assessment: Patient is a 75 year old female with posterior shoulder pain while putting on clothing complaints.  The following significant findings have been identified: Pain, Decreased ROM/flexibility, and Decreased strength. These impairments interfere with their ability to perform self care tasks as compared to previous level of function.     Clinical Decision Making (Complexity):  Clinical Presentation: Stable/Uncomplicated  Clinical Presentation Rationale: based on medical and personal factors listed in PT evaluation  Clinical Decision Making (Complexity): Low complexity    PLAN OF CARE  Treatment Interventions:  Interventions: Gait Training, Manual Therapy, Neuromuscular Re-education, Therapeutic Activity, Therapeutic Exercise    Long Term Goals     PT Goal 1  Goal Identifier: (P) HEP  Goal Description: (P) Pt will be 80% compliant with HEP  Target Date: (P) 10/27/25  PT Goal 2  Goal Identifier: (P) Pain  Goal Description: (P) Pt will not have pain while putting on clothing  Target Date: (P) 10/27/25  PT Goal 3  Goal Identifier: (P) strength  Goal Description: (P) Pt will increase  L shoulder strength to a 5/5  Rationale: (P) to maximize safety and independence with performance of ADLs and functional tasks  Target Date: (P) 10/27/25      Frequency of Treatment: (P) 1x/ every other week for 8 weeks  Duration of Treatment: (P) 8 weeks    Recommended Referrals to Other Professionals:   Education Assessment:   Learner/Method: (P) Significant Other;Demonstration;Listening    Risks and benefits of evaluation/treatment have been explained.   Patient/Family/caregiver agrees with Plan of Care.     Evaluation Time:     PT Eval, Low Complexity Minutes (30401): (P) 20       Signing Clinician: Jaleel Cohn PT        [facility]                                                                                   OUTPATIENT PHYSICAL THERAPY      PLAN OF TREATMENT FOR OUTPATIENT REHABILITATION   Patient's Last Name, First Name, Juanita Calderon YOB: 1950   Provider's Name   [facility]   Medical Record No.  [id number]     Onset Date: (P) 04/21/25  Start of Care Date: (P) 07/29/25     Medical Diagnosis:  Chronic left shoulder pain (M25.512, G89.29)      PT Treatment Diagnosis:  left shoulder weakness Plan of Treatment  Frequency/Duration: (P) 1x/ every other week for 8 weeks/ (P) 8 weeks    Certification date from (P) 07/29/25 to (P) 11/26/25         See note for plan of treatment details and functional goals     Jaleel Cohn, PT                         I CERTIFY THE NEED FOR THESE SERVICES FURNISHED UNDER        THIS PLAN OF TREATMENT AND WHILE UNDER MY CARE     (Physician attestation of this document indicates review and certification of the therapy plan).              Referring Provider:  Michelle House    Initial Assessment  See Epic Evaluation- Start of Care Date: (P) 07/29/25

## 2025-08-05 ENCOUNTER — THERAPY VISIT (OUTPATIENT)
Dept: PHYSICAL THERAPY | Facility: REHABILITATION | Age: 75
End: 2025-08-05
Payer: MEDICARE

## 2025-08-05 DIAGNOSIS — M25.512 CHRONIC LEFT SHOULDER PAIN: Primary | ICD-10-CM

## 2025-08-05 DIAGNOSIS — G89.29 CHRONIC LEFT SHOULDER PAIN: Primary | ICD-10-CM

## 2025-08-05 PROCEDURE — 97112 NEUROMUSCULAR REEDUCATION: CPT | Mod: GP

## 2025-08-05 PROCEDURE — 97110 THERAPEUTIC EXERCISES: CPT | Mod: GP

## 2025-08-11 ENCOUNTER — OFFICE VISIT (OUTPATIENT)
Dept: NEUROLOGY | Facility: CLINIC | Age: 75
End: 2025-08-11
Payer: MEDICARE

## 2025-08-11 VITALS
SYSTOLIC BLOOD PRESSURE: 134 MMHG | WEIGHT: 135 LBS | HEIGHT: 64 IN | BODY MASS INDEX: 23.05 KG/M2 | DIASTOLIC BLOOD PRESSURE: 77 MMHG | HEART RATE: 81 BPM

## 2025-08-11 DIAGNOSIS — F06.70: ICD-10-CM

## 2025-08-11 DIAGNOSIS — H53.2 DIPLOPIA: ICD-10-CM

## 2025-08-11 DIAGNOSIS — G20.A1: ICD-10-CM

## 2025-08-11 DIAGNOSIS — G20.C PRIMARY PARKINSONISM (H): Primary | ICD-10-CM

## 2025-08-11 PROCEDURE — G2211 COMPLEX E/M VISIT ADD ON: HCPCS | Performed by: PSYCHIATRY & NEUROLOGY

## 2025-08-11 PROCEDURE — 3075F SYST BP GE 130 - 139MM HG: CPT | Performed by: PSYCHIATRY & NEUROLOGY

## 2025-08-11 PROCEDURE — 99215 OFFICE O/P EST HI 40 MIN: CPT | Performed by: PSYCHIATRY & NEUROLOGY

## 2025-08-11 PROCEDURE — 3078F DIAST BP <80 MM HG: CPT | Performed by: PSYCHIATRY & NEUROLOGY

## 2025-08-11 RX ORDER — DONEPEZIL HYDROCHLORIDE 5 MG/1
5 TABLET, FILM COATED ORAL AT BEDTIME
Qty: 90 TABLET | Refills: 1 | Status: SHIPPED | OUTPATIENT
Start: 2025-08-11

## 2025-08-11 RX ORDER — THIAMINE HCL 100 MG
1 TABLET ORAL DAILY
Qty: 90 TABLET | Refills: 1 | Status: SHIPPED | OUTPATIENT
Start: 2025-08-11

## 2025-08-18 ENCOUNTER — MYC MEDICAL ADVICE (OUTPATIENT)
Dept: FAMILY MEDICINE | Facility: CLINIC | Age: 75
End: 2025-08-18
Payer: MEDICARE

## 2025-08-29 ENCOUNTER — MYC REFILL (OUTPATIENT)
Dept: FAMILY MEDICINE | Facility: CLINIC | Age: 75
End: 2025-08-29
Payer: MEDICARE

## 2025-08-29 DIAGNOSIS — E78.5 HYPERLIPIDEMIA LDL GOAL <100: ICD-10-CM

## 2025-09-02 ENCOUNTER — MYC REFILL (OUTPATIENT)
Dept: FAMILY MEDICINE | Facility: CLINIC | Age: 75
End: 2025-09-02

## 2025-09-02 DIAGNOSIS — E78.5 HYPERLIPIDEMIA LDL GOAL <100: ICD-10-CM

## 2025-09-02 RX ORDER — ATORVASTATIN CALCIUM 10 MG/1
10 TABLET, FILM COATED ORAL DAILY
Qty: 90 TABLET | Refills: 0 | OUTPATIENT
Start: 2025-09-02

## 2025-09-03 RX ORDER — ATORVASTATIN CALCIUM 10 MG/1
10 TABLET, FILM COATED ORAL DAILY
Qty: 90 TABLET | Refills: 0 | OUTPATIENT
Start: 2025-09-03